# Patient Record
Sex: MALE | Race: WHITE | Employment: OTHER | URBAN - METROPOLITAN AREA
[De-identification: names, ages, dates, MRNs, and addresses within clinical notes are randomized per-mention and may not be internally consistent; named-entity substitution may affect disease eponyms.]

---

## 2017-01-02 DIAGNOSIS — I10 HYPERTENSION GOAL BP (BLOOD PRESSURE) < 140/90: Primary | ICD-10-CM

## 2017-01-02 DIAGNOSIS — K21.9 GASTROESOPHAGEAL REFLUX DISEASE WITHOUT ESOPHAGITIS: ICD-10-CM

## 2017-01-03 RX ORDER — ATENOLOL 25 MG/1
TABLET ORAL
Qty: 90 TABLET | Refills: 1 | Status: SHIPPED | OUTPATIENT
Start: 2017-01-03 | End: 2017-06-27

## 2017-01-03 NOTE — TELEPHONE ENCOUNTER
Omeprazole       Last Written Prescription Date: 10/28/2015  Last Fill Quantity: 180,  # refills: PRN   Last Office Visit with [facility], [facility] or [facility] prescribing provider: 06/30/2016    Atenolol       Last Written Prescription Date: 10/28/2015  Last Fill Quantity: 90, # refills: PRN  Last Office Visit with [facility], [facility] or [facility] prescribing provider: 10/28/2015       POTASSIUM   Date Value Ref Range Status   03/03/2015 4.1 3.4 - 5.3 mmol/L Final     CREATININE   Date Value Ref Range Status   03/03/2015 0.90 0.66 - 1.25 mg/dL Final     BP Readings from Last 3 Encounters:   06/30/16 117/76   05/13/16 120/77   05/08/16 124/76

## 2017-01-03 NOTE — TELEPHONE ENCOUNTER
Prescription approved per [facility].  Amarilys Mackenzie RN

## 2017-02-17 DIAGNOSIS — A60.00 HERPES SIMPLEX INFECTION OF GENITOURINARY SYSTEM: ICD-10-CM

## 2017-02-17 DIAGNOSIS — A60.00 HERPES GENITALIS: Primary | ICD-10-CM

## 2017-02-17 NOTE — TELEPHONE ENCOUNTER
Routing refill request to provider for review/approval because:  Labs not current:  Needs yearly Creatinine  A break in medication  Vanessa DEY RN

## 2017-02-17 NOTE — TELEPHONE ENCOUNTER
Pending Prescriptions:                       Disp   Refills    acyclovir (ZOVIRAX) 800 MG tablet [Pharma*30 tab*PRN          Sig: TAKE 1 TABLET BY MOUTH THREE TIMES DAILY AT START           OF SYMPTOMS         Last Written Prescription Date: 9/1/15  Last Fill Quantity: 30, # refills: prn  Last Office Visit with [facility], [facility] or [facility] prescribing provider: 6/30/16 with Dr. Suero       Creatinine   Date Value Ref Range Status   03/03/2015 0.90 0.66 - 1.25 mg/dL Final     Navya Joshi MA  [facility]

## 2017-02-18 PROBLEM — A60.00 HERPES SIMPLEX INFECTION OF GENITOURINARY SYSTEM: Status: ACTIVE | Noted: 2017-02-18

## 2017-02-18 PROBLEM — A60.00 HERPES GENITALIS: Status: ACTIVE | Noted: 2017-02-18

## 2017-02-18 RX ORDER — ACYCLOVIR 800 MG/1
TABLET ORAL
Qty: 30 TABLET | Status: SHIPPED | OUTPATIENT
Start: 2017-02-18 | End: 2018-03-13

## 2017-04-04 ENCOUNTER — MYC MEDICAL ADVICE (OUTPATIENT)
Dept: FAMILY MEDICINE | Facility: CLINIC | Age: 62
End: 2017-04-04

## 2017-05-01 ENCOUNTER — OFFICE VISIT (OUTPATIENT)
Dept: FAMILY MEDICINE | Facility: CLINIC | Age: 62
End: 2017-05-01
Payer: COMMERCIAL

## 2017-05-01 VITALS
WEIGHT: 178 LBS | DIASTOLIC BLOOD PRESSURE: 83 MMHG | TEMPERATURE: 97.1 F | OXYGEN SATURATION: 96 % | BODY MASS INDEX: 26.98 KG/M2 | HEART RATE: 74 BPM | SYSTOLIC BLOOD PRESSURE: 139 MMHG | HEIGHT: 68 IN | RESPIRATION RATE: 16 BRPM

## 2017-05-01 DIAGNOSIS — I10 HYPERTENSION GOAL BP (BLOOD PRESSURE) < 140/90: Primary | ICD-10-CM

## 2017-05-01 LAB
ANION GAP SERPL CALCULATED.3IONS-SCNC: 9 MMOL/L (ref 3–14)
BUN SERPL-MCNC: 13 MG/DL (ref 7–30)
CALCIUM SERPL-MCNC: 9.1 MG/DL (ref 8.5–10.1)
CHLORIDE SERPL-SCNC: 106 MMOL/L (ref 94–109)
CO2 SERPL-SCNC: 25 MMOL/L (ref 20–32)
CREAT SERPL-MCNC: 0.92 MG/DL (ref 0.66–1.25)
CREAT UR-MCNC: 109 MG/DL
GFR SERPL CREATININE-BSD FRML MDRD: 84 ML/MIN/1.7M2
GLUCOSE SERPL-MCNC: 92 MG/DL (ref 70–99)
MICROALBUMIN UR-MCNC: <5 MG/L
MICROALBUMIN/CREAT UR: NORMAL MG/G CR (ref 0–17)
POTASSIUM SERPL-SCNC: 3.9 MMOL/L (ref 3.4–5.3)
SODIUM SERPL-SCNC: 140 MMOL/L (ref 133–144)

## 2017-05-01 PROCEDURE — 36415 COLL VENOUS BLD VENIPUNCTURE: CPT | Performed by: FAMILY MEDICINE

## 2017-05-01 PROCEDURE — 82043 UR ALBUMIN QUANTITATIVE: CPT | Performed by: FAMILY MEDICINE

## 2017-05-01 PROCEDURE — 80048 BASIC METABOLIC PNL TOTAL CA: CPT | Performed by: FAMILY MEDICINE

## 2017-05-01 PROCEDURE — 99213 OFFICE O/P EST LOW 20 MIN: CPT | Performed by: FAMILY MEDICINE

## 2017-05-01 RX ORDER — LISINOPRIL 10 MG/1
10 TABLET ORAL DAILY
Qty: 30 TABLET | Refills: 0 | Status: SHIPPED | OUTPATIENT
Start: 2017-05-01 | End: 2017-07-07

## 2017-05-01 NOTE — NURSING NOTE
"Chief Complaint   Patient presents with     Hypertension     /83  Pulse 74  Temp 97.1  F (36.2  C) (Oral)  Resp 16  Ht 5' 8\" (1.727 m)  Wt 178 lb (80.7 kg)  SpO2 96%  BMI 27.06 kg/m2 Estimated body mass index is 27.06 kg/(m^2) as calculated from the following:    Height as of this encounter: 5' 8\" (1.727 m).    Weight as of this encounter: 178 lb (80.7 kg).  bp completed using cuff size: regular       Health Maintenance addressed:  NONE    n/a    Hiral Issa MA     "

## 2017-05-01 NOTE — PROGRESS NOTES
SUBJECTIVE:                                                    Edgardo Dominguez is a 61 year old male who presents to clinic today for the following health issues:      Hypertension Follow-up      Outpatient blood pressures are being checked at home and work.  Results are 150/90.    Low Salt Diet: not monitoring salt     Also discuss asiatica nerve on both sides very painful.      Amount of exercise or physical activity: 2-3 days/week for an average of 45-60 minutes    Problems taking medications regularly: No    Medication side effects: none    Diet: regular (no restrictions)          Problem list and histories reviewed & adjusted, as indicated.  Additional history: as documented      Reviewed and updated as needed this visit by clinical staff  Tobacco  Allergies  Meds  Problems  Med Hx  Surg Hx  Fam Hx  Soc Hx        Reviewed and updated as needed this visit by Provider  Meds  Problems         He has had hypertension well controlled with atenolol 25 mg for over 10 years, but in the last month he has noticed more headaches and he finally got out his blood pressure monitor and discovered he was getting around 150/90 most of the time  Especially later in the day, sometimes okay in the morning. He said that he had his blood pressure cuff checked against the nurses at work so he thinks it's accurate but I don't know how long ago he did that. He exercises 3 times a week, has a sedentary job, reactive on the weekends. He's had a little more sciatica lately in the last few weeks.    Objective: Multiple blood pressure readings are really quite good. He was a little lower on the left than the rightand I got numbers as low as 124/66 and as high as 134/78. Heart is regular without murmurs. Lungs are clear. Abdomen benign. No edema.    Assessment and plan: Likely his hypertension isn't as well controlled although its not that it so good this morning. He will check and make sure that his home blood pressure cuff is  accurate but if it is I think it would be wise for him to start lisinopril 10 mg a day and see me in 1 month. Will do basic lab tests today.

## 2017-05-01 NOTE — MR AVS SNAPSHOT
"              After Visit Summary   5/1/2017    Edgardo Dominguez    MRN: [id number]           Patient Information     Date Of Birth          1955        Visit Information        Provider Department      5/1/2017 8:15 AM Maurice Suero MD [facility]        Today's Diagnoses     Hypertension goal BP (blood pressure) < 140/90    -  1       Follow-ups after your visit        Who to contact     If you have questions or need follow up information about today's clinic visit or your schedule please contact [facility] directly at [phone].  Normal or non-critical lab and imaging results will be communicated to you by [employer], letter or phone within 4 business days after the clinic has received the results. If you do not hear from us within 7 days, please contact the clinic through [employer] or phone. If you have a critical or abnormal lab result, we will notify you by phone as soon as possible.  Submit refill requests through [employer]ee or call your pharmacy and they will forward the refill request to us. Please allow 3 business days for your refill to be completed.          Additional Information About Your Visit        MyChart Information     [employer] gives you secure access to your electronic health record. If you see a primary care provider, you can also send messages to your care team and make appointments. If you have questions, please call your primary care clinic.  If you do not have a primary care provider, please call [phone] and they will assist you.        Care EveryWhere ID     This is your Care EveryWhere ID. This could be used by other organizations to access your [place] medical records  [id number]        Your Vitals Were     Pulse Temperature Respirations Height Pulse Oximetry BMI (Body Mass Index)    74 97.1  F (36.2  C) (Oral) 16 5' 8\" (1.727 m) 96% 27.06 kg/m2       Blood Pressure from Last 3 Encounters:   05/01/17 139/83   06/30/16 117/76   05/13/16 120/77    " Weight from Last 3 Encounters:   05/01/17 178 lb (80.7 kg)   05/13/16 177 lb 3.2 oz (80.4 kg)   05/08/16 172 lb 3.2 oz (78.1 kg)              We Performed the Following     Albumin Random Urine Quantitative     Basic metabolic panel          Today's Medication Changes          These changes are accurate as of: 5/1/17 11:34 AM.  If you have any questions, ask your nurse or doctor.               Start taking these medicines.        Dose/Directions    lisinopril 10 MG tablet   Commonly known as:  PRINIVIL/ZESTRIL   Used for:  Hypertension goal BP (blood pressure) < 140/90   Started by:  Maurice Suero MD        Dose:  10 mg   Take 1 tablet (10 mg) by mouth daily   Quantity:  30 tablet   Refills:  0            Where to get your medicines      These medications were sent to [id number] - [place] - [address] AT [facility] [address]  [address], [place] [address]    Hours:  24-hours Phone:  [phone]     lisinopril 10 MG tablet                Primary Care Provider Office Phone # Fax #    Maurice Suero [phone]       [facility] [address] [facility]  [address]  [place] [address]        Thank you!     Thank you for choosing [facility]  for your care. Our goal is always to provide you with excellent care. Hearing back from our patients is one way we can continue to improve our services. Please take a few minutes to complete the written survey that you may receive in the mail after your visit with us. Thank you!             Your Updated Medication List - Protect others around you: Learn how to safely use, store and throw away your medicines at www.disposemymeds.org.          This list is accurate as of: 5/1/17 11:34 AM.  Always use your most recent med list.                   Brand Name Dispense Instructions for use    acyclovir 800 MG tablet    ZOVIRAX    30 tablet    TAKE 1 TABLET BY MOUTH THREE TIMES DAILY AT START OF SYMPTOMS       atenolol  25 MG tablet    TENORMIN    90 tablet    TAKE 1 TABLET BY MOUTH DAILY       lisinopril 10 MG tablet    PRINIVIL/ZESTRIL    30 tablet    Take 1 tablet (10 mg) by mouth daily       mometasone 0.1 % cream    ELOCON    45 g    Apply  topically daily.       omeprazole 20 MG CR capsule    priLOSEC    180 capsule    TAKE TWO CAPSULES BY MOUTH DAILY       TYLENOL PO

## 2017-05-02 ENCOUNTER — MYC MEDICAL ADVICE (OUTPATIENT)
Dept: FAMILY MEDICINE | Facility: CLINIC | Age: 62
End: 2017-05-02

## 2017-05-02 DIAGNOSIS — M54.40 ACUTE RIGHT-SIDED LOW BACK PAIN WITH SCIATICA, SCIATICA LATERALITY UNSPECIFIED: Primary | ICD-10-CM

## 2017-05-02 NOTE — TELEPHONE ENCOUNTER
Dr Suero please see message below. The clinic the patient is requesting is out of network for his insurance. With his insurance he is assigned to the [facility] network. Please advise.  Thanks    Sarahy Teague  Referral Coordinator

## 2017-05-02 NOTE — TELEPHONE ENCOUNTER
Yes it matters with [facility]. If we have those services available the patient needs to stay within [facility] if assigned to the [facility] network.    Sarahy Teague  Referral Coordinator

## 2017-05-08 ENCOUNTER — THERAPY VISIT (OUTPATIENT)
Dept: PHYSICAL THERAPY | Facility: CLINIC | Age: 62
End: 2017-05-08
Payer: COMMERCIAL

## 2017-05-08 DIAGNOSIS — M54.16 LUMBAR RADICULOPATHY: Primary | ICD-10-CM

## 2017-05-08 DIAGNOSIS — M54.50 LOW BACK PAIN: ICD-10-CM

## 2017-05-08 PROCEDURE — 97110 THERAPEUTIC EXERCISES: CPT | Mod: GP | Performed by: PHYSICAL THERAPIST

## 2017-05-08 PROCEDURE — 97161 PT EVAL LOW COMPLEX 20 MIN: CPT | Mod: GP | Performed by: PHYSICAL THERAPIST

## 2017-05-08 PROCEDURE — 97530 THERAPEUTIC ACTIVITIES: CPT | Mod: GP | Performed by: PHYSICAL THERAPIST

## 2017-05-08 NOTE — MR AVS SNAPSHOT
After Visit Summary   5/8/2017    Edgardo Dominguez    MRN: [id number]           Patient Information     Date Of Birth          1955        Visit Information        Provider Department      5/8/2017 9:20 AM Davina Be, PT [facility] [place] [facility]        Today's Diagnoses     Lumbar radiculopathy    -  1    Low back pain           Follow-ups after your visit        Your next 10 appointments already scheduled     May 15, 2017 10:00 AM CDT   [facility] Spine with Davina Be PT   [place] [facility] ([facility]  )    [address]  [facility] [address]   [phone]            May 22, 2017 10:10 AM CDT   [facility] Spine with Renée Cole PT   [place] [facility] ([facility]  )    [address]  [facility] [address]   [phone]            Jun 05, 2017  9:20 AM CDT   [facility] Spine with Davina Be PT   [facility] ([facility]  )    [address]  [facility] [address]   [phone]            Jun 12, 2017  9:20 AM CDT   [facility] Spine with Davina Be PT   [facility] ([facility]  )    [address]  [facility] [address]   [phone]            Jun 19, 2017  9:20 AM CDT   [facility] Spine with Davina Be PT   [place] [facility] ([facility]  )    [address]  [facility] [address]   [phone]              Who to contact     If you have questions or need follow up information about today's clinic visit or your schedule please contact [facility] directly at [phone].  Normal or non-critical lab and imaging results will be communicated to you by MyChart, letter or phone within 4 business days after the clinic has received the results. If you do not  hear from us within 7 days, please contact the clinic through [employer] or phone. If you have a critical or abnormal lab result, we will notify you by phone as soon as possible.  Submit refill requests through [employer] or call your pharmacy and they will forward the refill request to us. Please allow 3 business days for your refill to be completed.          Additional Information About Your Visit        [employer] Information     [employer] gives you secure access to your electronic health record. If you see a primary care provider, you can also send messages to your care team and make appointments. If you have questions, please call your primary care clinic.  If you do not have a primary care provider, please call [phone] and they will assist you.        Care EveryWhere ID     This is your Care EveryWhere ID. This could be used by other organizations to access your [place] medical records  [id number]         Blood Pressure from Last 3 Encounters:   05/01/17 139/83   06/30/16 117/76   05/13/16 120/77    Weight from Last 3 Encounters:   05/01/17 80.7 kg (178 lb)   05/13/16 80.4 kg (177 lb 3.2 oz)   05/08/16 78.1 kg (172 lb 3.2 oz)              We Performed the Following     HC PT EVAL, LOW COMPLEXITY     [facility]WN INITIAL EVAL REPORT     THERAPEUTIC ACTIVITIES     THERAPEUTIC EXERCISES        Primary Care Provider Office Phone # Fax #    Maurice Suero [phone]       [facility] [address] [address]        Thank you!     Thank you for choosing [facility]  for your care. Our goal is always to provide you with excellent care. Hearing back from our patients is one way we can continue to improve our services. Please take a few minutes to complete the written survey that you may receive in the mail after your visit with us. Thank you!             Your Updated Medication List - Protect others around you: Learn how to safely  use, store and throw away your medicines at www.disposemymeds.org.          This list is accurate as of: 5/8/17 12:42 PM.  Always use your most recent med list.                   Brand Name Dispense Instructions for use    acyclovir 800 MG tablet    ZOVIRAX    30 tablet    TAKE 1 TABLET BY MOUTH THREE TIMES DAILY AT START OF SYMPTOMS       atenolol 25 MG tablet    TENORMIN    90 tablet    TAKE 1 TABLET BY MOUTH DAILY       lisinopril 10 MG tablet    PRINIVIL/ZESTRIL    30 tablet    Take 1 tablet (10 mg) by mouth daily       mometasone 0.1 % cream    ELOCON    45 g    Apply  topically daily.       omeprazole 20 MG CR capsule    priLOSEC    180 capsule    TAKE TWO CAPSULES BY MOUTH DAILY       TYLENOL PO

## 2017-05-08 NOTE — PROGRESS NOTES
Subjective:    Patient is a 61 year old male presenting with rehab back hpi.   Edgardo Dominguez is a 61 year old male with a lumbar condition.  Condition occurred with:  Insidious onset.  Condition occurred: for unknown reasons.  This is a new condition  5/2/17 referral. Pt reports hx of back pain, however this is the first time it has gone down the legs.  Pain started about 2 weeks ago - pt states he recently bought a new vehicle, doesn't know if that contributes.  Also bought a small tree the day pain began, was moving it around the yard.  Previous episodes were treated with chiropractic, no help this episode.  Pain is primarily with sitting; pt reports work chair is adjustbale, not the best quality.  Pt reports R great toe fusion about 1 year ago, current L knee bursitis..    Patient reports pain:  Central lumbar spine.  Radiates to:  Thigh left, thigh right, gluteals right and gluteals left (to mid thigh, posterior).  Pain is described as aching and is constant and reported as 5/10.  Associated symptoms:  Tingling (intermittent). Pain is the same all the time.  Symptoms are exacerbated by sitting and relieved by ice, NSAID's and other (walking).  Since onset symptoms are unchanged.    Previous treatment includes chiropractic.  There was no improvement following previous treatment.  General health as reported by patient is good.  Pertinent medical history includes:  High blood pressure and implanted device (pin in R great toe; HTN working on managing with PCP).  Medical allergies: no.  Other surgeries include:  Orthopedic surgery (R great toe, ankle).  Current medications:  High blood pressure medication.  Current occupation is school counselor.  Patient is working in normal job without restrictions (has been trying to get up and move more).  Primary job tasks include:  Prolonged sitting (computer work).    Barriers include:  None as reported by the patient.    Red flags:  Pain at rest/night.                         Objective:    Standing Alignment:    Cervical/thoracic deviations alignment: mid-thoracic kyphosis increased.    Lumbar:  Lordosis decr                      Physical Exam     Functional Tests:  Forward bend: reverses lumbar curve, most motion comes from mid/low thoracic, lacks full hip flexion, no change in sxs  Back bend: no change in sxs, most motion from high lumbar  L trunk rotation: no change in sxs  R trunk rotation: no change in sxs  L trunk lateral flexion: no change in sxs  R trunk lateral flexion: no change in sxs  L single leg stance: Trendelenberg, no change in sxs  R single leg stance: no change in sxs  Prone hip ext with knee extended: B recruits hamstrings prior to glut max, goes into lumbar ext and rotation but no change in sxs  sidelying hip abd/LR: B goes into lumbar rotation    Sensation:  Light touch intact and symmetrical B LEs    Strength:  Hip flexion: B 5/5 pain free  Knee extension: B 5/5 pain free  Dorsiflexion: B 5/5 pain free  Glut med: R 3+/5, L 4/5 pain free  Glut max: B 5/5 pain free  Hamstrings: B 5/5 pain free    ROM:  Trunk AROM WNL    Relative Flexibility:  Lumbar spine is relatively more flexible than B hip joints    Palpation:  Slight tenderness L glut max, otherwise pain free at B paraspinals, R glute, B posterior thighs  Stiffness to cetnral PAs of lumbar spine, no increase in sxs    Edema:  None noted    Gait:  R trunk lean, R>L Trendelenberg        General     ROS    Assessment/Plan:      Patient is a 61 year old male with lumbar complaints.    Patient has the following significant findings with corresponding treatment plan.                Diagnosis 1:  LBP with radiculopathy  Pain -  hot/cold therapy, manual therapy, education and home program  Decreased joint mobility - manual therapy, therapeutic exercise, therapeutic activity and home program  Decreased strength - therapeutic exercise, therapeutic activities and home program  Impaired gait - gait training and home  program  Impaired muscle performance - neuro re-education and home program  Decreased function - therapeutic activities and home program  Impaired posture - neuro re-education, therapeutic activities and home program    Therapy Evaluation Codes:   1) History comprised of:   Personal factors that impact the plan of care:      Age and Profession.    Comorbidity factors that impact the plan of care are:      High blood pressure, Implanted device and Pain at night/rest.     Medications impacting care: High blood pressure.  2) Examination of Body Systems comprised of:   Body structures and functions that impact the plan of care:      Lumbar spine.   Activity limitations that impact the plan of care are:      Sitting.  3) Clinical presentation characteristics are:   Stable/Uncomplicated.  4) Decision-Making    Low complexity using standardized patient assessment instrument and/or measureable assessment of functional outcome.  Cumulative Therapy Evaluation is: Low complexity.    Previous and current functional limitations:  (See Goal Flow Sheet for this information)    Short term and Long term goals: (See Goal Flow Sheet for this information)     Communication ability:  Patient appears to be able to clearly communicate and understand verbal and written communication and follow directions correctly.  Treatment Explanation - The following has been discussed with the patient:   RX ordered/plan of care  Anticipated outcomes  Possible risks and side effects  This patient would benefit from PT intervention to resume normal activities.   Rehab potential is good.    Frequency:  1 X week, once daily  Duration:  for 6 weeks  Discharge Plan:  Achieve all LTG.  Independent in home treatment program.  Reach maximal therapeutic benefit.    Please refer to the daily flowsheet for treatment today, total treatment time and time spent performing 1:1 timed codes.

## 2017-05-15 ENCOUNTER — THERAPY VISIT (OUTPATIENT)
Dept: PHYSICAL THERAPY | Facility: CLINIC | Age: 62
End: 2017-05-15
Payer: COMMERCIAL

## 2017-05-15 DIAGNOSIS — M54.16 LUMBAR RADICULOPATHY: ICD-10-CM

## 2017-05-15 DIAGNOSIS — M54.50 LOW BACK PAIN: ICD-10-CM

## 2017-05-15 PROCEDURE — 97112 NEUROMUSCULAR REEDUCATION: CPT | Mod: GP | Performed by: PHYSICAL THERAPIST

## 2017-05-15 PROCEDURE — 97110 THERAPEUTIC EXERCISES: CPT | Mod: GP | Performed by: PHYSICAL THERAPIST

## 2017-05-22 ENCOUNTER — THERAPY VISIT (OUTPATIENT)
Dept: PHYSICAL THERAPY | Facility: CLINIC | Age: 62
End: 2017-05-22
Payer: COMMERCIAL

## 2017-05-22 DIAGNOSIS — M54.41 ACUTE BILATERAL LOW BACK PAIN WITH BILATERAL SCIATICA: ICD-10-CM

## 2017-05-22 DIAGNOSIS — M54.42 ACUTE BILATERAL LOW BACK PAIN WITH BILATERAL SCIATICA: ICD-10-CM

## 2017-05-22 DIAGNOSIS — M54.16 LUMBAR RADICULOPATHY: ICD-10-CM

## 2017-05-22 PROCEDURE — 97112 NEUROMUSCULAR REEDUCATION: CPT | Mod: GP | Performed by: PHYSICAL THERAPIST

## 2017-05-22 PROCEDURE — 97110 THERAPEUTIC EXERCISES: CPT | Mod: GP | Performed by: PHYSICAL THERAPIST

## 2017-05-22 NOTE — MR AVS SNAPSHOT
After Visit Summary   5/22/2017    Edgardo Dominguez    MRN: [id number]           Patient Information     Date Of Birth          1955        Visit Information        Provider Department      5/22/2017 10:10 AM Renée Cole, PT [facility] Athletic Medicine [facility] Physical Therapy        Today's Diagnoses     Acute bilateral low back pain with bilateral sciatica        Lumbar radiculopathy           Follow-ups after your visit        Your next 10 appointments already scheduled     Jun 05, 2017  9:20 AM CDT   SHAWN Spine with Davina Be PT   [facility] Athletic Medicine [facility] Physical Therapy ([facility]  )    [address]  [facility] [address]   [phone]            Jun 12, 2017  9:20 AM CDT   SHAWN Spine with Davina Be PT   [facility] Athletic [facility] Physical Therapy ([facility]  )    [address]  [facility] [address]   [phone]            Jun 19, 2017  9:20 AM CDT   SHAWN Spine with Davina Be PT   [facility] Athletic [facility] Physical Therapy ([facility]  )    [address]  [facility] [address]   [phone]              Who to contact     If you have questions or need follow up information about today's clinic visit or your schedule please contact [facility] ATHLETIC [facility] PHYSICAL THERAPY directly at [phone].  Normal or non-critical lab and imaging results will be communicated to you by MyChart, letter or phone within 4 business days after the clinic has received the results. If you do not hear from us within 7 days, please contact the clinic through MyChart or phone. If you have a critical or abnormal lab result, we will notify you by phone as soon as possible.  Submit refill requests through [employer] or call your pharmacy and they will forward the refill request to us. Please allow 3 business days for your refill to be completed.          Additional Information About Your  Visit        [employer] Information     [employer] gives you secure access to your electronic health record. If you see a primary care provider, you can also send messages to your care team and make appointments. If you have questions, please call your primary care clinic.  If you do not have a primary care provider, please call [phone] and they will assist you.        Care EveryWhere ID     This is your Care EveryWhere ID. This could be used by other organizations to access your [place] medical records  [id number]         Blood Pressure from Last 3 Encounters:   05/01/17 139/83   06/30/16 117/76   05/13/16 120/77    Weight from Last 3 Encounters:   05/01/17 80.7 kg (178 lb)   05/13/16 80.4 kg (177 lb 3.2 oz)   05/08/16 78.1 kg (172 lb 3.2 oz)              We Performed the Following     NEUROMUSCULAR RE-EDUCATION     THERAPEUTIC EXERCISES        Primary Care Provider Office Phone # Fax #    Maurice Suero [phone]       [facility] [address] [address]        Thank you!     Thank you for choosing [facility] PHYSICAL THERAPY  for your care. Our goal is always to provide you with excellent care. Hearing back from our patients is one way we can continue to improve our services. Please take a few minutes to complete the written survey that you may receive in the mail after your visit with us. Thank you!             Your Updated Medication List - Protect others around you: Learn how to safely use, store and throw away your medicines at www.disposemymeds.org.          This list is accurate as of: 5/22/17 10:56 AM.  Always use your most recent med list.                   Brand Name Dispense Instructions for use    acyclovir 800 MG tablet    ZOVIRAX    30 tablet    TAKE 1 TABLET BY MOUTH THREE TIMES DAILY AT START OF SYMPTOMS       atenolol 25 MG tablet    TENORMIN    90 tablet    TAKE 1 TABLET BY MOUTH DAILY       lisinopril 10 MG  tablet    PRINIVIL/ZESTRIL    30 tablet    Take 1 tablet (10 mg) by mouth daily       mometasone 0.1 % cream    ELOCON    45 g    Apply  topically daily.       omeprazole 20 MG CR capsule    priLOSEC    180 capsule    TAKE TWO CAPSULES BY MOUTH DAILY       TYLENOL PO

## 2017-06-26 ENCOUNTER — OFFICE VISIT (OUTPATIENT)
Dept: ORTHOPEDICS | Facility: CLINIC | Age: 62
End: 2017-06-26
Payer: COMMERCIAL

## 2017-06-26 ENCOUNTER — RADIANT APPOINTMENT (OUTPATIENT)
Dept: GENERAL RADIOLOGY | Facility: CLINIC | Age: 62
End: 2017-06-26
Attending: PHYSICAL MEDICINE & REHABILITATION
Payer: COMMERCIAL

## 2017-06-26 VITALS
HEIGHT: 68 IN | WEIGHT: 172 LBS | DIASTOLIC BLOOD PRESSURE: 82 MMHG | SYSTOLIC BLOOD PRESSURE: 131 MMHG | BODY MASS INDEX: 26.07 KG/M2

## 2017-06-26 DIAGNOSIS — M54.41 ACUTE BILATERAL LOW BACK PAIN WITH BILATERAL SCIATICA: Primary | ICD-10-CM

## 2017-06-26 DIAGNOSIS — M51.369 LUMBAR DEGENERATIVE DISC DISEASE: ICD-10-CM

## 2017-06-26 DIAGNOSIS — M54.42 ACUTE BILATERAL LOW BACK PAIN WITH BILATERAL SCIATICA: ICD-10-CM

## 2017-06-26 DIAGNOSIS — M54.42 ACUTE BILATERAL LOW BACK PAIN WITH BILATERAL SCIATICA: Primary | ICD-10-CM

## 2017-06-26 DIAGNOSIS — M54.41 ACUTE BILATERAL LOW BACK PAIN WITH BILATERAL SCIATICA: ICD-10-CM

## 2017-06-26 PROCEDURE — 99204 OFFICE O/P NEW MOD 45 MIN: CPT | Performed by: PHYSICAL MEDICINE & REHABILITATION

## 2017-06-26 PROCEDURE — 72100 X-RAY EXAM L-S SPINE 2/3 VWS: CPT

## 2017-06-26 RX ORDER — GABAPENTIN 300 MG/1
300 CAPSULE ORAL 3 TIMES DAILY
Qty: 90 CAPSULE | Refills: 2 | Status: SHIPPED | OUTPATIENT
Start: 2017-06-26 | End: 2018-05-24

## 2017-06-26 NOTE — MR AVS SNAPSHOT
After Visit Summary   6/26/2017    Edgardo Dominguez    MRN: [id number]           Patient Information     Date Of Birth          1955        Visit Information        Provider Department      6/26/2017 11:20 AM Aung Randle DO [facility]        Today's Diagnoses     Acute bilateral low back pain with bilateral sciatica    -  1    Lumbar degenerative disc disease          Care Instructions    We addressed the following today:    1. Low back pain with radiation    Activity modification as discussed  Home exercise program as instructed  Topical Treatments: Ice or heat  Over the counter medication: Acetaminophen (Tylenol) 1000 mg every 6 hours with food (Maximum of 3000 mg/day)  Prescription Medication as directed: Initiate Gabapentin 300 mg at night for 3 days.  If able to tolerate medication without side effects, increase to 300 mg twice daily for 3 days.  If able to tolerate medication without side effects, increase to 300 mg three times daily for treatment purposes  Call/MyChart in 4 weeks if no improvement of symptoms to determine further medical care (sooner if needed; call direct clinic number [[phone]] at any time with questions or concerns)              Follow-ups after your visit        Your next 10 appointments already scheduled     Jul 07, 2017 10:00 AM CDT   Office Visit with Josef Ray MD   [facility] ([facility])    [address] [place] [address] [phone]           Bring a current list of meds and any records pertaining to this visit.  For Physicals, please bring immunization records and any forms needing to be filled out.  Please arrive 10 minutes early to complete paperwork.              Who to contact     If you have questions or need follow up information about today's clinic visit or your schedule please contact [facility] directly at [phone].  Normal or  "non-critical lab and imaging results will be communicated to you by MyChart, letter or phone within 4 business days after the clinic has received the results. If you do not hear from us within 7 days, please contact the clinic through [employer] or phone. If you have a critical or abnormal lab result, we will notify you by phone as soon as possible.  Submit refill requests through [employer] or call your pharmacy and they will forward the refill request to us. Please allow 3 business days for your refill to be completed.          Additional Information About Your Visit        [employer] Information     [employer] gives you secure access to your electronic health record. If you see a primary care provider, you can also send messages to your care team and make appointments. If you have questions, please call your primary care clinic.  If you do not have a primary care provider, please call [phone] and they will assist you.        Care EveryWhere ID     This is your Care EveryWhere ID. This could be used by other organizations to access your [place] medical records  [id number]        Your Vitals Were     Height BMI (Body Mass Index)                5' 8\" (1.727 m) 26.15 kg/m2           Blood Pressure from Last 3 Encounters:   06/26/17 131/82   05/01/17 139/83   06/30/16 117/76    Weight from Last 3 Encounters:   06/26/17 172 lb (78 kg)   05/01/17 178 lb (80.7 kg)   05/13/16 177 lb 3.2 oz (80.4 kg)                 Today's Medication Changes          These changes are accurate as of: 6/26/17 11:59 PM.  If you have any questions, ask your nurse or doctor.               Start taking these medicines.        Dose/Directions    gabapentin 300 MG capsule   Commonly known as:  NEURONTIN   Used for:  Acute bilateral low back pain with bilateral sciatica   Started by:  Aung Randle,         Dose:  300 mg   Take 1 capsule (300 mg) by mouth 3 times daily   Quantity:  90 capsule   Refills:  2            Where to get your medicines "      These medications were sent to [id number] - [place], [place] - [address] [facility] [address]  [address], [place] [address]    Hours:  24-hours Phone:  [phone]     gabapentin 300 MG capsule                Primary Care Provider Office Phone # Fax #    Maurice Suero [phone]       [facility] [address]  [facility] [address]        Equal Access to Services     TRISTAN SANTIAGO AH: Hadii aad ku hadasho Soomaali, waaxda luqadaha, qaybta kaalmada adeegyada, waxay idiin hayaan adeeg kharash la'trinidad . So [facility] [phone].    ATENCIÓN: Si habla español, tiene a raygoza disposición servicios gratuitos de asistencia lingüística. [facility] [phone].    We comply with applicable federal civil rights laws and [place] laws. We do not discriminate on the basis of race, color, national origin, age, disability sex, sexual orientation or gender identity.            Thank you!     Thank you for choosing [facility]  for your care. Our goal is always to provide you with excellent care. Hearing back from our patients is one way we can continue to improve our services. Please take a few minutes to complete the written survey that you may receive in the mail after your visit with us. Thank you!             Your Updated Medication List - Protect others around you: Learn how to safely use, store and throw away your medicines at www.disposemymeds.org.          This list is accurate as of: 6/26/17 11:59 PM.  Always use your most recent med list.                   Brand Name Dispense Instructions for use Diagnosis    acyclovir 800 MG tablet    ZOVIRAX    30 tablet    TAKE 1 TABLET BY MOUTH THREE TIMES DAILY AT START OF SYMPTOMS    Herpes simplex infection of genitourinary system       gabapentin 300 MG capsule    NEURONTIN    90 capsule    Take 1 capsule (300 mg) by mouth 3 times daily    Acute bilateral low back pain with bilateral sciatica        lisinopril 10 MG tablet    PRINIVIL/ZESTRIL    30 tablet    Take 1 tablet (10 mg) by mouth daily    Hypertension goal BP (blood pressure) < 140/90       mometasone 0.1 % cream    ELOCON    45 g    Apply  topically daily.    Eczema       TYLENOL PO

## 2017-06-26 NOTE — NURSING NOTE
"Chief Complaint   Patient presents with     Musculoskeletal Problem     acute low back pain with radiation       Initial /82  Ht 5' 8\" (1.727 m)  Wt 172 lb (78 kg)  BMI 26.15 kg/m2 Estimated body mass index is 26.15 kg/(m^2) as calculated from the following:    Height as of this encounter: 5' 8\" (1.727 m).    Weight as of this encounter: 172 lb (78 kg).  Medication Reconciliation: complete     James Cedeño ATC      "

## 2017-06-26 NOTE — PROGRESS NOTES
" [facility]   Clinic Visit s Jun 26, 2017    Subjective:  Edgardo Dominguez is a 61 year old male who is seen as self referral for evaluation of acute low back pain with radiation.    Symptoms began 2 months ago.  Reports insidious onset without acute precipitating event.  Reports constant dull back pain that is located bilateral low back with radiation present to bilateral posterior thigh regions.  Pain is 5/10 in maximal severity and 3/10 currently.  Symptoms are generally worse with sitting and with laying flat on his back and better with standing activities.  Other treatment has consisted of physical therapy (SHAWN), chiropractic care, massage, and Aceteminophen with moderate relief.  Associated symptoms include denies any bowel/bladder dysfunction or saddle anesthesia.  Denies any numbness/tingling/weakness of the lower extremities.  Denies any previous low back injuries/surgeries.    Patient's past medical, surgical, social, and family histories are reviewed today.  No pertinent past medical history  Past Medical History:   Diagnosis Date     Esophageal reflux      Hypertension      Other genital herpes      Unspecified disorder of lipoid metabolism      Varicella without mention of complication        Review of Systems:  Constitutional: NEGATIVE for fever, chills, or change in weight  Skin: NEGATIVE for worrisome rashes, moles, or lesions  Neuro: NEGATIVE for weakness of the lower extremities  MSK: see HPI  Additional 10 point ROS is negative other than symptoms noted above and in HPI    Objective:  /82  Ht 5' 8\" (1.727 m)  Wt 172 lb (78 kg)  BMI 26.15 kg/m2  General: healthy, alert, and in no distress  Skin: no suspicious lesions or rashes  Psych: mentation appears normal and affect normal/bright  HEENT: no scleral icterus  CV: no pedal edema  Resp: normal respiratory effort without conversational dyspnea   Neuro: decreased sensation to light touch of the left medial malleolus " region.  Motor strength as noted below  Lymph: no palpable lymphadenopathy    MSK:    THORACIC/LUMBAR SPINE  Inspection:    No redness, swelling, overlying skin change, or gross deformity/asymmetry  Palpation:    No tenderness over the lumbar spinous processes, left para lumbar muscles, right para lumbar muscles, left SI joint, right SI joint, left sciatic notch, or right sciatic notch  Range of Motion:     Lumbar flexion within normal limits    Lumbar extension within normal limits  Strength:    5/5 - quadriceps, hamstrings, tibialis anterior, gastrocsoleus, and extensor hallicus longus  Special Tests:    Positive: None    Negative: Straight leg raise (bilateral), SI joint compression (bilateral), and RC (bilateral)    BILATERAL HIPS  Inspection:    No swelling, bruising, discoloration, or obvious deformity or asymmetry  Passive Range of Motion:    IR within normal limits / ER within normal limits  Strength:    Flexion 5/5 / extension 5/5 / abduction 5/5  Special Tests:    Positive: None    Negative: Logroll, resisted gluteus medius provocation, anterior impingement (FADIR), and passive ER with hip flexion (Drehman's)    Imaging:  Lumbar spine x-rays were ordered, independent visualization of images was performed, and interpreted in the office today  Impression:  1. Slight levoscoliosis.  2. Mild degenerative disc disease at L2-3 and L3-4 with retrolisthesis.  3. Mild retrolisthesis at L1-2.  4. Vascular calcifications noted.  5. Mild multilevel lateral hyperostosis noted of the thoracic/lumbar spine regions.  6. Negative for fracture or other acute osseous abnormalities.    ASSESSMENT:  1. Acute bilateral low back pain with bilateral sciatica  2. Lumbar degenerative disc disease    PLAN:  1. Activity modification as discussed, including limitation of activities that cause pain/discomfort.  2. Acetaminophen/heat/ice as needed for improved pain control.  3. Initiate Gabapentin 300 mg at night for 3 days.  If able  to tolerate medication without side effects, increase to 300 mg twice daily for 3 days.  If able to tolerate medication without side effects, increase to 300 mg three times daily for treatment purposes.  4. Continue with home exercise program as previously prescribed during formal physical therapy.  5. Call/MyChart in 4 weeks if no improvement of symptoms to determine further medical care.  If symptoms resolve completely, can follow-up as needed.  Consider MRI of the lumbar spine without contrast, etc. as deemed appropriate moving forward. Instructed to contact our office should the condition evolve or worsen, or should any new/progressive neurologic symptoms develop.    Patient's conditions were thoroughly discussed during today's visit with greater than 50% of the visit spent counseling the patient with total time spent face-to-face with the patient being 15 minutes.    Aung Randle DO, [facility]    Disclaimer: This note consists of symbols derived from keyboarding, dictation and/or voice recognition software. As a result, there may be errors in the script that have gone undetected. Please consider this when interpreting information found in this chart.

## 2017-06-26 NOTE — Clinical Note
Dear Edgardo Orr saw me at [facility] on Jun 26, 2017.  Please refer to the visit note at your convenience and feel free to contact me should you have any questions.  Sincerely,  Aung Randle DO, [facility]

## 2017-06-26 NOTE — PATIENT INSTRUCTIONS
We addressed the following today:    1. Low back pain with radiation    Activity modification as discussed  Home exercise program as instructed  Topical Treatments: Ice or heat  Over the counter medication: Acetaminophen (Tylenol) 1000 mg every 6 hours with food (Maximum of 3000 mg/day)  Prescription Medication as directed: Initiate Gabapentin 300 mg at night for 3 days.  If able to tolerate medication without side effects, increase to 300 mg twice daily for 3 days.  If able to tolerate medication without side effects, increase to 300 mg three times daily for treatment purposes  Call/MyChart in 4 weeks if no improvement of symptoms to determine further medical care (sooner if needed; call direct clinic number [[phone]] at any time with questions or concerns)

## 2017-06-27 DIAGNOSIS — K21.9 GASTROESOPHAGEAL REFLUX DISEASE WITHOUT ESOPHAGITIS: ICD-10-CM

## 2017-06-27 DIAGNOSIS — I10 HYPERTENSION GOAL BP (BLOOD PRESSURE) < 140/90: ICD-10-CM

## 2017-06-27 RX ORDER — ATENOLOL 25 MG/1
TABLET ORAL
Qty: 30 TABLET | Refills: 0 | Status: SHIPPED | OUTPATIENT
Start: 2017-06-27 | End: 2017-07-07

## 2017-06-27 NOTE — TELEPHONE ENCOUNTER
ALBARO,  Refill request for former [facility] patient.  Patient scheduled to establish with you 7/7/2017  Will run out of medication before then.  Pended 30 day supply of below meds if you approve.  Thanks,  Vanessa DEY RN    Pending Prescriptions:                       Disp   Refills    atenolol (TENORMIN) 25 MG tablet [Pharmacy*90 tab*0        Sig: TAKE 1 TABLET BY MOUTH DAILY    omeprazole (PRILOSEC) 20 MG CR capsule [Ph*180 ca*0        Sig: TAKE TWO CAPSULES BY MOUTH DAILY    Atenolol      Last Written Prescription Date: 1/3/2017  Last Fill Quantity: 90, # refills: 1  Last Office Visit with [facility] or [facility] prescribing provider: 5/1/2017    Potassium   Date Value Ref Range Status   05/01/2017 3.9 3.4 - 5.3 mmol/L Final     Creatinine   Date Value Ref Range Status   05/01/2017 0.92 0.66 - 1.25 mg/dL Final     BP Readings from Last 3 Encounters:   06/26/17 131/82   05/01/17 139/83   06/30/16 117/76     Omeprazole      Last Written Prescription Date: 1/3/2017  Last Fill Quantity: 180,  # refills: 1   Last Office Visit with [facility], [facility] or [facility] prescribing provider: 5/1/2017

## 2017-07-07 ENCOUNTER — OFFICE VISIT (OUTPATIENT)
Dept: FAMILY MEDICINE | Facility: CLINIC | Age: 62
End: 2017-07-07
Payer: COMMERCIAL

## 2017-07-07 VITALS
HEART RATE: 79 BPM | DIASTOLIC BLOOD PRESSURE: 70 MMHG | BODY MASS INDEX: 27.55 KG/M2 | OXYGEN SATURATION: 98 % | SYSTOLIC BLOOD PRESSURE: 110 MMHG | RESPIRATION RATE: 16 BRPM | WEIGHT: 175.5 LBS | TEMPERATURE: 98.3 F | HEIGHT: 67 IN

## 2017-07-07 DIAGNOSIS — K21.9 GASTROESOPHAGEAL REFLUX DISEASE WITHOUT ESOPHAGITIS: ICD-10-CM

## 2017-07-07 DIAGNOSIS — Z00.00 ROUTINE HISTORY AND PHYSICAL EXAMINATION OF ADULT: Primary | ICD-10-CM

## 2017-07-07 DIAGNOSIS — M70.42 PREPATELLAR BURSITIS OF LEFT KNEE: ICD-10-CM

## 2017-07-07 DIAGNOSIS — I10 HYPERTENSION GOAL BP (BLOOD PRESSURE) < 140/90: ICD-10-CM

## 2017-07-07 PROCEDURE — 99396 PREV VISIT EST AGE 40-64: CPT | Performed by: FAMILY MEDICINE

## 2017-07-07 RX ORDER — LISINOPRIL 10 MG/1
10 TABLET ORAL DAILY
Qty: 30 TABLET | Refills: 0 | Status: CANCELLED | OUTPATIENT
Start: 2017-07-07

## 2017-07-07 RX ORDER — ATENOLOL 25 MG/1
25 TABLET ORAL DAILY
Qty: 90 TABLET | Refills: 3 | Status: SHIPPED | OUTPATIENT
Start: 2017-07-07 | End: 2017-07-13

## 2017-07-07 NOTE — NURSING NOTE
"Chief Complaint   Patient presents with     Physical     initial /70 (BP Location: Right arm, Cuff Size: Adult Regular)  Pulse 79  Temp 98.3  F (36.8  C) (Oral)  Resp 16  Ht 5' 6.75\" (1.695 m)  Wt 175 lb 8 oz (79.6 kg)  SpO2 98%  BMI 27.69 kg/m2 Estimated body mass index is 27.69 kg/(m^2) as calculated from the following:    Height as of this encounter: 5' 6.75\" (1.695 m).    Weight as of this encounter: 175 lb 8 oz (79.6 kg).  BP completed using cuff size: regular.  R arm      Health Maintenance that is potentially due pending provider review:  NONE    n/a    Edgar Soto ma  "

## 2017-07-07 NOTE — MR AVS SNAPSHOT
After Visit Summary   7/7/2017    Edgardo Dominguez    MRN: [id number]           Patient Information     Date Of Birth          1955        Visit Information        Provider Department      7/7/2017 10:00 AM Josef Ray MD [facility]        Today's Diagnoses     Routine history and physical examination of adult    -  1    Hypertension goal BP (blood pressure) < 140/90        Gastroesophageal reflux disease without esophagitis        Prepatellar bursitis of left knee          Care Instructions      Preventive Health Recommendations  Male Ages 50 - 64    Yearly exam:             See your health care provider every year in order to  o   Review health changes.   o   Discuss preventive care.    o   Review your medicines if your doctor has prescribed any.     Have a cholesterol test every 5 years, or more frequently if you are at risk for high cholesterol/heart disease.     Have a diabetes test (fasting glucose) every three years. If you are at risk for diabetes, you should have this test more often.     Have a colonoscopy at age 50, or have a yearly FIT test (stool test). These exams will check for colon cancer.      Talk with your health care provider about whether or not a prostate cancer screening test (PSA) is right for you.    You should be tested each year for STDs (sexually transmitted diseases), if you re at risk.     Shots: Get a flu shot each year. Get a tetanus shot every 10 years.     Nutrition:    Eat at least 5 servings of fruits and vegetables daily.     Eat whole-grain bread, whole-wheat pasta and brown rice instead of white grains and rice.     Talk to your provider about Calcium and Vitamin D.     Lifestyle    Exercise for at least 150 minutes a week (30 minutes a day, 5 days a week). This will help you control your weight and prevent disease.     Limit alcohol to one drink per day.     No smoking.     Wear sunscreen to prevent skin cancer.     See your dentist  every six months for an exam and cleaning.     See your eye doctor every 1 to 2 years.            Follow-ups after your visit        Additional Services     ORTHOPEDICS ADULT REFERRAL       Your provider has referred you to: [facility] [phone]   [email or web address]    Please be aware that coverage of these services is subject to the terms and limitations of your health insurance plan.  Call member services at your health plan with any benefit or coverage questions.      Please bring the following to your appointment:    >>   Any x-rays, CTs or MRIs which have been performed.  Contact the facility where they were done to arrange for  prior to your scheduled appointment.    >>   List of current medications   >>   This referral request   >>   Any documents/labs given to you for this referral                  Who to contact     If you have questions or need follow up information about today's clinic visit or your schedule please contact [facility] directly at [phone].  Normal or non-critical lab and imaging results will be communicated to you by [employer], letter or phone within 4 business days after the clinic has received the results. If you do not hear from us within 7 days, please contact the clinic through [employer] or phone. If you have a critical or abnormal lab result, we will notify you by phone as soon as possible.  Submit refill requests through [employer] or call your pharmacy and they will forward the refill request to us. Please allow 3 business days for your refill to be completed.          Additional Information About Your Visit        [employer] Information     [employer] gives you secure access to your electronic health record. If you see a primary care provider, you can also send messages to your care team and make appointments. If you have questions, please call your primary care clinic.  If you do not have a primary care provider, please call  "[phone] and they will assist you.        Care EveryWhere ID     This is your Care EveryWhere ID. This could be used by other organizations to access your [place] medical records  [id number]        Your Vitals Were     Pulse Temperature Respirations Height Pulse Oximetry BMI (Body Mass Index)    79 98.3  F (36.8  C) (Oral) 16 5' 6.75\" (1.695 m) 98% 27.69 kg/m2       Blood Pressure from Last 3 Encounters:   07/07/17 110/70   06/26/17 131/82   05/01/17 139/83    Weight from Last 3 Encounters:   07/07/17 175 lb 8 oz (79.6 kg)   06/26/17 172 lb (78 kg)   05/01/17 178 lb (80.7 kg)              We Performed the Following     ORTHOPEDICS ADULT REFERRAL          Today's Medication Changes          These changes are accurate as of: 7/7/17 10:21 AM.  If you have any questions, ask your nurse or doctor.               These medicines have changed or have updated prescriptions.        Dose/Directions    atenolol 25 MG tablet   Commonly known as:  TENORMIN   This may have changed:  See the new instructions.   Used for:  Hypertension goal BP (blood pressure) < 140/90   Changed by:  Josef Ray MD        Dose:  25 mg   Take 1 tablet (25 mg) by mouth daily   Quantity:  90 tablet   Refills:  3       omeprazole 20 MG CR capsule   Commonly known as:  priLOSEC   This may have changed:  See the new instructions.   Used for:  Gastroesophageal reflux disease without esophagitis   Changed by:  Josef Ray MD        Dose:  20 mg   Take 1 capsule (20 mg) by mouth 2 times daily   Quantity:  180 capsule   Refills:  3         Stop taking these medicines if you haven't already. Please contact your care team if you have questions.     lisinopril 10 MG tablet   Commonly known as:  PRINIVIL/ZESTRIL   Stopped by:  Josef Ray MD                Where to get your medicines      These medications were sent to [facility] [id number] [place] [address], [place] - [address] [facility] [address], " [facility] [address]    Hours:  24-hours Phone:  [phone]     atenolol 25 MG tablet    omeprazole 20 MG CR capsule                Primary Care Provider Office Phone # Fax #    Maurice Suero [phone]       [facility] [address]  [facility] [address]        Equal Access to Services     [facility]: Hadii aad ku hadasho Soomaali, waaxda luqadaha, qaybta kaalmada adeegyada, waxay idiin hayaan adeeg kharash laAntonietaaan ah. So [facility] [phone].    ATENCIÓN: Si habla español, tiene a raygoza disposición servicios gratuitos de asistencia lingüística. Bryant al [phone].    We comply with applicable federal civil rights laws and [place] laws. We do not discriminate on the basis of race, color, national origin, age, disability sex, sexual orientation or gender identity.            Thank you!     Thank you for choosing [facility]  for your care. Our goal is always to provide you with excellent care. Hearing back from our patients is one way we can continue to improve our services. Please take a few minutes to complete the written survey that you may receive in the mail after your visit with us. Thank you!             Your Updated Medication List - Protect others around you: Learn how to safely use, store and throw away your medicines at www.disposemymeds.org.          This list is accurate as of: 7/7/17 10:21 AM.  Always use your most recent med list.                   Brand Name Dispense Instructions for use Diagnosis    acyclovir 800 MG tablet    ZOVIRAX    30 tablet    TAKE 1 TABLET BY MOUTH THREE TIMES DAILY AT START OF SYMPTOMS    Herpes simplex infection of genitourinary system       atenolol 25 MG tablet    TENORMIN    90 tablet    Take 1 tablet (25 mg) by mouth daily    Hypertension goal BP (blood pressure) < 140/90       gabapentin 300 MG capsule    NEURONTIN    90 capsule    Take 1 capsule (300 mg) by mouth 3 times daily    Acute bilateral low back pain  with bilateral sciatica       mometasone 0.1 % cream    ELOCON    45 g    Apply  topically daily.    Eczema       omeprazole 20 MG CR capsule    priLOSEC    180 capsule    Take 1 capsule (20 mg) by mouth 2 times daily    Gastroesophageal reflux disease without esophagitis       TYLENOL PO

## 2017-07-07 NOTE — PROGRESS NOTES
SUBJECTIVE:   CC: Edgardo Dominguez is an 61 year old male who presents for preventative health visit.     Healthy Habits:    Do you get at least three servings of calcium containing foods daily (dairy, green leafy vegetables, etc.)? yes    Amount of exercise or daily activities, outside of work: 3-4 day(s) per week    Problems taking medications regularly No    Medication side effects: No    Have you had an eye exam in the past two years? yes    Do you see a dentist twice per year? yes    Do you have sleep apnea, excessive snoring or daytime drowsiness?no        PROBLEMS TO ADD ON...    Today's PHQ-2 Score:   PHQ-2 ( 1999 Pfizer) 5/1/2017 6/26/2016   Q1: Little interest or pleasure in doing things 0 -   Q2: Feeling down, depressed or hopeless 0 -   PHQ-2 Score 0 -   Q1: Little interest or pleasure in doing things - Not at all   Q2: Feeling down, depressed or hopeless - Not at all   PHQ-2 Score - 0       Abuse: Current or Past(Physical, Sexual or Emotional)- No  Do you feel safe in your environment - Yes    Social History   Substance Use Topics     Smoking status: Never Smoker     Smokeless tobacco: Never Used     Alcohol use 0.0 oz/week     0 Standard drinks or equivalent per week      Comment: 3 a week      The patient does not drink >3 drinks per day nor >7 drinks per week.    Last PSA: No results found for: PSA    Reviewed orders with patient. Reviewed health maintenance and updated orders accordingly - Yes    STD screening:  History   Sexual Activity     Sexual activity: Yes     Partners: Male     no screening desired today    Stable hypertension  Patient was actually started on lisinopril earlier this year because his blood pressures were high at home but he found out that the cuff was broken  So he stopped the lisinopril and today his blood pressure is quite good  Also needs refill on his reflux medication  He also has chronic left prepatellar bursitis and would like a new referral to orthopedics it never  went away and he would like to have it removed  Also this year he was diagnosed with spinal stenosis and is on some gabapentin for that but we are not managing today    ROS: As per HPI.  Constitutional: no recent illness, no fevers/sweats/chills, sleep normal  Eyes: No vision changes or eye irritation  Ears/Nose/Throat: No runny nose, sore throat or ear pain  CV: no palpitations, no chest pain, no lower extremity swelling.  Resp: no shortness of breath, wheezing, or cough.  GI: no nausea/vomiting/diarrhea, normal stooling pattern, no reflux symptoms, no black or bloody stools  : no burning or urgency with urination, no blood in urine, no sores or discharge.  Skin: no changing moles or other lesions, no rash  Musculoskeletal:  As noted above  Psych: no depression, no concerns about anxiety  Neuro: no new headaches, dizziness    I have reviewed and updated the patient's past medical, social and family history in the EMR. Current problems are:  Patient Active Problem List    Diagnosis Date Noted     Low back pain 05/08/2017     Priority: Medium     Lumbar radiculopathy 05/08/2017     Priority: Medium     Herpes simplex infection of genitourinary system 02/18/2017     Priority: Medium     's knee, left 10/28/2015     Priority: Medium     Hallux limitus of right foot 03/03/2015     Priority: Medium     Advanced directives, counseling/discussion 07/18/2012     Priority: Medium     Discussed advance care planning with patient; information given to patient to review. 7/18/2012        Hyperlipidemia LDL goal <160 07/19/2011     Priority: Medium     Hypertension goal BP (blood pressure) < 140/90 07/19/2011     Priority: Medium     Other genital herpes 09/15/2005     Priority: Medium     Esophageal reflux 11/11/2003     Priority: Medium     Family Hx:  Family History   Problem Relation Age of Onset     Hypertension Mother      Arthritis Mother      C.A.D. Mother      Lipids Mother      Breast Cancer Mother       mastectomy 2012     C.A.D. Father      GASTROINTESTINAL DISEASE Father      Alzheimer Disease Father      Lipids Father      HEART DISEASE Maternal Grandmother      Hypertension Maternal Grandmother      CEREBROVASCULAR DISEASE Maternal Grandmother      HEART DISEASE Maternal Grandfather      CEREBROVASCULAR DISEASE Maternal Grandfather      CEREBROVASCULAR DISEASE Paternal Grandmother      Alzheimer Disease Paternal Grandmother      HEART DISEASE Paternal Grandfather      CEREBROVASCULAR DISEASE Paternal Grandfather      Alcohol/Drug Sister      Depression Sister      Musculoskeletal Disorder Brother      Lipids Brother      Social History:  Social History   Substance Use Topics     Smoking status: Never Smoker     Smokeless tobacco: Never Used     Alcohol use 0.0 oz/week     0 Standard drinks or equivalent per week      Comment: 3 a week      Social History     Social History Narrative    Social Documentation:7/10        Balanced Diet: YES    Calcium intake: 1200mg per day (supplements),vit    Caffeine: 2-3 per day - soda    Exercise:  type of activity gym;  4-5  times per week    Sunscreen: Yes    Seatbelts:  Yes    Self Testicular Exam: yes    Physical/Emotional/Sexual Abuse: No     Do you feel safe in your environment? Yes        Cholesterol screen up to date: today    Eye Exam up to date: Yes    Dental Exam up to date: Yes    Dexa Scan up to date: Does Not Apply    Colonoscopy up to date: Yes 11/23/2005    Immunizations up to date: Yes    Glucose screen if over 40:  No -         Edgar Soto ma                 Allergies:     Allergies   Allergen Reactions     Hydrocodone Nausea     Oxycodone Nausea      Current Medications:  Current Outpatient Prescriptions   Medication Sig Dispense Refill     omeprazole (PRILOSEC) 20 MG CR capsule Take 1 capsule (20 mg) by mouth 2 times daily 180 capsule 3     atenolol (TENORMIN) 25 MG tablet Take 1 tablet (25 mg) by mouth daily 90 tablet 3     gabapentin (NEURONTIN) 300  "MG capsule Take 1 capsule (300 mg) by mouth 3 times daily 90 capsule 2     [DISCONTINUED] atenolol (TENORMIN) 25 MG tablet TAKE 1 TABLET BY MOUTH DAILY 30 tablet 0     acyclovir (ZOVIRAX) 800 MG tablet TAKE 1 TABLET BY MOUTH THREE TIMES DAILY AT START OF SYMPTOMS 30 tablet PRN     Acetaminophen (TYLENOL PO)        mometasone (ELOCON) 0.1 % cream Apply  topically daily. 45 g prn        Objective:  /70 (BP Location: Right arm, Cuff Size: Adult Regular)  Pulse 79  Temp 98.3  F (36.8  C) (Oral)  Resp 16  Ht 5' 6.75\" (1.695 m)  Wt 175 lb 8 oz (79.6 kg)  SpO2 98%  BMI 27.69 kg/m2  General Appearance: Pleasant, alert, WN/WD in no acute respiratory distress.  Head Exam: Normal. Normocephalic, atraumatic. No sinus tenderness.  Eye Exam: Normal external eye, conjunctiva, lids. CARMINA.  Ear Exam: Normal auditory canals and external ears. Non-tender.  Left TM-normal. Right TM-normal.  OroPharynx Exam: Dental hygiene adequate. Normal buccal mucosa. Normal pharynx.  Neck Exam: Supple, no masses or enlarged, tender nodes.  Thyroid Exam: No nodules or enlargement or tenderness.  Chest/Respiratory Exam: Normal, comfortable, easy respirations. Chest wall normal. Lungs are clear to auscultation. No wheezing, crackles, or rhonchi.  Cardiovascular Exam: Regular rate and rhythm. No murmur, gallop, or rubs. No pedal edema.  Gastrointestinal Exam: Soft, non-tender, no masses or organomegaly.  Musculoskeletal Exam: Back is non-tender, full ROM of upper and lower extremities.  Left large prepatellar bursitis  Skin: no rash, warm and dry.  Neurologic Exam: Nonfocal, no tremor. Normal gait.  Psychiatric Exam: Alert - appropriate, normal affect    ASSESSMENT/PLAN:    ICD-10-CM    1. Routine history and physical examination of adult Z00.00    2. Hypertension goal BP (blood pressure) < 140/90 I10 atenolol (TENORMIN) 25 MG tablet   3. Gastroesophageal reflux disease without esophagitis K21.9 omeprazole (PRILOSEC) 20 MG CR capsule   4. " "Prepatellar bursitis of left knee M70.42 ORTHOPEDICS ADULT REFERRAL       COUNSELING:  Reviewed preventive health counseling, as reflected in patient instructions       Regular exercise       Healthy diet/nutrition       Colon cancer screening       Prostate cancer screening   Discussed the risks and benefits of prostate cancer screening via PSA, current recommendation from the USPSTF is against screening  But this could be modified by individual risk or family history  Patient declined screening today         reports that he has never smoked. He has never used smokeless tobacco.    Estimated body mass index is 26.15 kg/(m^2) as calculated from the following:    Height as of 6/26/17: 5' 8\" (1.727 m).    Weight as of 6/26/17: 172 lb (78 kg).       Counseling Resources:  ATP IV Guidelines  Pooled Cohorts Equation Calculator  FRAX Risk Assessment  ICSI Preventive Guidelines  Dietary Guidelines for Americans, 2010  USDA's MyPlate  ASA Prophylaxis  Lung CA Screening    Josef Cholo Ray MD  [facility]  "

## 2017-07-11 ENCOUNTER — MYC MEDICAL ADVICE (OUTPATIENT)
Dept: ORTHOPEDICS | Facility: CLINIC | Age: 62
End: 2017-07-11

## 2017-07-11 DIAGNOSIS — M54.42 ACUTE BILATERAL LOW BACK PAIN WITH BILATERAL SCIATICA: Primary | ICD-10-CM

## 2017-07-11 DIAGNOSIS — M54.41 ACUTE BILATERAL LOW BACK PAIN WITH BILATERAL SCIATICA: Primary | ICD-10-CM

## 2017-07-11 NOTE — TELEPHONE ENCOUNTER
Please reach out to patient to inform him that should give Gabapentin a 1 month trial to see if improvement of symptoms noted with use of medication. Can send a prescription for Flexeril to be used at night to help with sleep if the patient is interested.    Aung Randle DO, CAQSM  [facility]

## 2017-07-12 ENCOUNTER — TELEPHONE (OUTPATIENT)
Dept: FAMILY MEDICINE | Facility: CLINIC | Age: 62
End: 2017-07-12

## 2017-07-12 DIAGNOSIS — I10 HYPERTENSION GOAL BP (BLOOD PRESSURE) < 140/90: ICD-10-CM

## 2017-07-12 RX ORDER — CYCLOBENZAPRINE HCL 5 MG
TABLET ORAL
Qty: 45 TABLET | Refills: 0 | Status: SHIPPED | OUTPATIENT
Start: 2017-07-12 | End: 2017-10-20

## 2017-07-12 NOTE — TELEPHONE ENCOUNTER
JF,  Spoke to pharm ([facility]) and the Atenolol is on back order- sounds like a manufacturing issue as no [facility] has any and [facility] has a limited supply per phar,    Requesting a different med sent over.    Please advise.  Thanks,  Barbara Venegas RN

## 2017-07-12 NOTE — TELEPHONE ENCOUNTER
Talked with patient, and he has agreed to give the Gabapentin a trial.     He would also like the Flexeril to help him sleep at night.    Medication is pended, please sign orders. Pharmacy is selected in [facility].

## 2017-07-13 RX ORDER — METOPROLOL SUCCINATE 25 MG/1
25 TABLET, EXTENDED RELEASE ORAL DAILY
Qty: 90 TABLET | Refills: 3 | Status: SHIPPED | OUTPATIENT
Start: 2017-07-13 | End: 2018-04-10

## 2017-07-13 NOTE — TELEPHONE ENCOUNTER
Medication (Flexeril) sent to the patient's pharmacy.  Please inform the patient.    Aung Randle DO, HALM  [facility]

## 2017-07-30 DIAGNOSIS — K21.9 GASTROESOPHAGEAL REFLUX DISEASE WITHOUT ESOPHAGITIS: ICD-10-CM

## 2017-07-31 NOTE — TELEPHONE ENCOUNTER
Omeprazole       Last Written Prescription Date: 07/07/2017  Last Fill Quantity: 180,  # refills: 3   Last Office Visit with G, [facility] or [facility] prescribing provider: 07/07/2017

## 2017-08-07 PROBLEM — M54.50 LOW BACK PAIN: Status: RESOLVED | Noted: 2017-05-08 | Resolved: 2017-08-07

## 2017-08-07 PROBLEM — M54.16 LUMBAR RADICULOPATHY: Status: RESOLVED | Noted: 2017-05-08 | Resolved: 2017-08-07

## 2017-08-07 NOTE — PROGRESS NOTES
Subjective:    HPI                    Objective:    System    Physical Exam    General     ROS    Assessment/Plan:      DISCHARGE REPORT    Progress reporting period is from 5/8/2017 to 5/22/2017.       SUBJECTIVE  Subjective changes noted by patient:  .  Subjective: Sitting still difficult.  No problem gardening or sleeping.  Overall some improvement.    Current pain level is  Current Pain level: 4/10.     Previous pain level was   Initial Pain level: 5/10.   Changes in function:  Yes (See Goal flowsheet attached for changes in current functional level)  Adverse reaction to treatment or activity: None    OBJECTIVE  Changes noted in objective findings:  Yes,   Objective: Suggested he change his seat back in the car to as upright as possible.  ADvanced exercise and added practice with squatting to mimic gardening and lifting     ASSESSMENT/PLAN  Updated problem list and treatment plan: Diagnosis 1:  Back pain  Pain -  manual therapy, self management, education and home program  Decreased ROM/flexibility - manual therapy, therapeutic exercise and home program  Decreased strength - therapeutic exercise, therapeutic activities and home program  Decreased function - therapeutic activities and home program  STG/LTGs have been met or progress has been made towards goals:  Yes (See Goal flow sheet completed today.)  Assessment of Progress: The patient's condition has potential to improve.  The patient has not returned to therapy. Current status is unknown.  Self Management Plans:  Patient has been instructed in a home treatment program.  I have re-evaluated this patient and find that the nature, scope, duration and intensity of the therapy is appropriate for the medical condition of the patient.  Edgardo continues to require the following intervention to meet STG and LTG's:  Patient needs to continue to work on the home exercise program.      Recommendations:  Will discharge since patient has not returned.    Please refer to  the daily flowsheet for treatment today, total treatment time and time spent performing 1:1 timed codes.

## 2017-08-30 ENCOUNTER — HOSPITAL ENCOUNTER (OUTPATIENT)
Dept: MRI IMAGING | Facility: CLINIC | Age: 62
Discharge: HOME | End: 2017-08-30
Attending: PHYSICAL MEDICINE & REHABILITATION | Admitting: PHYSICAL MEDICINE & REHABILITATION
Payer: COMMERCIAL

## 2017-08-30 DIAGNOSIS — M54.42 CHRONIC BILATERAL LOW BACK PAIN WITH BILATERAL SCIATICA: ICD-10-CM

## 2017-08-30 DIAGNOSIS — M54.41 CHRONIC BILATERAL LOW BACK PAIN WITH BILATERAL SCIATICA: ICD-10-CM

## 2017-08-30 DIAGNOSIS — G89.29 CHRONIC BILATERAL LOW BACK PAIN WITH BILATERAL SCIATICA: ICD-10-CM

## 2017-08-30 PROCEDURE — 72148 MRI LUMBAR SPINE W/O DYE: CPT

## 2017-09-07 ENCOUNTER — OFFICE VISIT (OUTPATIENT)
Dept: ORTHOPEDICS | Facility: CLINIC | Age: 62
End: 2017-09-07
Payer: COMMERCIAL

## 2017-09-07 VITALS
HEIGHT: 67 IN | DIASTOLIC BLOOD PRESSURE: 83 MMHG | WEIGHT: 175 LBS | SYSTOLIC BLOOD PRESSURE: 148 MMHG | BODY MASS INDEX: 27.47 KG/M2

## 2017-09-07 DIAGNOSIS — M51.369 LUMBAR DEGENERATIVE DISC DISEASE: ICD-10-CM

## 2017-09-07 DIAGNOSIS — M48.061 LUMBAR FORAMINAL STENOSIS: Primary | ICD-10-CM

## 2017-09-07 PROCEDURE — 99214 OFFICE O/P EST MOD 30 MIN: CPT | Performed by: PHYSICAL MEDICINE & REHABILITATION

## 2017-09-07 NOTE — PROGRESS NOTES
" [facility]   Follow-up Visit s Sep 7, 2017    Subjective:  Edgardo Dominguez is a 62 year old male who is seen in follow up for evaluation of chronic bilateral low back pain with radiation for discussion of MRI of the lumbar spine without contrast results.  Last visit was on 6/26/2017. Since that time, symptoms have been minimally improved since starting the Gabapentin, currently taking 300 mg three times per day. Been using Flexeril and Ibuprofen for improvement of pain/discomfort. Has been continuing with home exercises that were previously prescribed during formal physical therapy.     Notes bilateral low back pain with radiation on the bilateral posterior thigh region. Symptoms are worse with sitting activities. Symptoms are improved by walking activities.  Denies any numbness/tingling in the lower extremities. Denies any weakness of the lower extremities. Denies any bowel/bladder incontinence. Denies any saddle anesthesia. Denies any trauma/falls since last clinical visit.    Patient's past medical, surgical, social, and family histories are reviewed today    Objective:  /83  Ht 5' 6.75\" (1.695 m)  Wt 175 lb (79.4 kg)  BMI 27.61 kg/m2  General: healthy, alert, and in no distress  Skin: no suspicious lesions or rashes  Neuro: sensory and motor exam grossly normal with no focal neurologic deficits appreciated    MSK:    THORACIC/LUMBAR SPINE  Strength:    5/5 - quadriceps, hamstrings, tibialis anterior, gastrocsoleus, and extensor hallicus longus    Imaging:  No x-rays indicated during today's visit  Previous films were reviewed today, independent visualization of images was performed, and results were discussed with the patient  MR LUMBAR SPINE WITHOUT CONTRAST - 8/30/2017   IMPRESSION:   1. Mild or early multilevel degenerative disc disease, greatest in the mid and upper lumbar spine.   2. Mild multilevel foraminal stenosis, greatest at L3-L4. No central " stenosis.    ASSESSMENT:  1. Lumbar foraminal stenosis  2. Lumbar degenerative disc disease    PLAN:  1. Discussed potential side effects and complications of a caudal epidural corticosteroid injection including but not necessarily limited to infection, bleeding, allergic reaction, post-injection flare, local tissue breakdown (including but not limited to potential for skin depigmentation and/or subcutaneous fat atrophy), systemic effects of corticosteroids, elevation of blood glucose, injury to soft tissue and/or nerves and seizure.  Patient indicated their understanding and agreed to proceed.    2. Activity modification as discussed, including limitation of activities that cause pain/discomfort.  3. Acetaminophen/Ibuprofen/ice/heat/Flexeril as needed for improved pain control.  4. Increase Gabapentin to 600 mg 3 times/day for further treatment purposes.  5. Follow up 2 weeks after completion of caudal epidural corticosteroid injection for further evaluation/medical care.  Consider EMG/NCV of the lower extremities, vascular referral/diagnostic testing, spine surgery referral, etc. as deemed appropriate moving forward. Instructed to follow-up if change of symptoms arise.    Patient's conditions were thoroughly discussed during today's visit with greater than 50% of the visit spent counseling the patient with total time spent face-to-face with the patient being 10 minutes.    Aung Randle DO, CAM  [facility]    Disclaimer: This note consists of symbols derived from keyboarding, dictation and/or voice recognition software. As a result, there may be errors in the script that have gone undetected. Please consider this when interpreting information found in this chart.

## 2017-09-07 NOTE — MR AVS SNAPSHOT
After Visit Summary   9/7/2017    Edgardo Dominguez    MRN: [id number]           Patient Information     Date Of Birth          1955        Visit Information        Provider Department      9/7/2017 10:00 AM Aung Randle DO [facility]        Today's Diagnoses     Lumbar foraminal stenosis    -  1    Lumbar degenerative disc disease          Care Instructions    We addressed the following today:    1. Lumbar foraminal stenosis/arthritis    Activity modification as discussed  Home exercise program as instructed  Topical Treatments: Ice or heat  Over the counter medication: Acetaminophen (Tylenol) 1000 mg every 6 hours with food (Maximum of 3000 mg/day)  Ibuprofen (Advil) maximum of 800 mg four times a day with food  Prescription Medication as directed: Increase Gabapentin to 600 mg 3 times/day for further treatment purposes  Caudal epidural corticosteroid injection to be completed at [facility] - call [phone] to schedule  Follow up 2 weeks after completion of caudal epidural corticosteroid injection for further evaluation/medical care (sooner if needed; call direct clinic number [[phone]] at any time with questions or concerns)              Follow-ups after your visit        Future tests that were ordered for you today     Open Future Orders        Priority Expected Expires Ordered    IR Caudal Epidural Injection Single Routine  9/7/2018 9/7/2017            Who to contact     If you have questions or need follow up information about today's clinic visit or your schedule please contact [facility] directly at [phone].  Normal or non-critical lab and imaging results will be communicated to you by MyChart, letter or phone within 4 business days after the clinic has received the results. If you do not hear from us within 7 days, please contact the clinic through MyChart or phone. If you have a critical or abnormal lab result, we will notify you by  "phone as soon as possible.  Submit refill requests through Imagistx or call your pharmacy and they will forward the refill request to us. Please allow 3 business days for your refill to be completed.          Additional Information About Your Visit        [employer] Information     Imag[employer]x gives you secure access to your electronic health record. If you see a primary care provider, you can also send messages to your care team and make appointments. If you have questions, please call your primary care clinic.  If you do not have a primary care provider, please call [phone] and they will assist you.        Care EveryWhere ID     This is your Care EveryWhere ID. This could be used by other organizations to access your [facility] medical records  [id number]        Your Vitals Were     Height BMI (Body Mass Index)                5' 6.75\" (1.695 m) 27.61 kg/m2           Blood Pressure from Last 3 Encounters:   09/07/17 148/83   07/07/17 110/70   06/26/17 131/82    Weight from Last 3 Encounters:   09/07/17 175 lb (79.4 kg)   07/07/17 175 lb 8 oz (79.6 kg)   06/26/17 172 lb (78 kg)               Primary Care Provider Office Phone # Fax #    Josef Cholo Ray [phone] [address] [facility] [address]        Equal Access to Services     TRISTAN SANTIAGO : Hadii aad ku hadasho Soomaali, waaxda luqadaha, qaybta kaalmada adeegyada, mahsa suresh. So [facility] [phone].    ATENCIÓN: Si habla español, tiene a raygoza disposición servicios gratuitos de asistencia lingüística. Llame al [phone].    We comply with applicable federal civil rights laws and [place] laws. We do not discriminate on the basis of race, color, national origin, age, disability sex, sexual orientation or gender identity.            Thank you!     Thank you for choosing [facility]  for your care. Our goal is always to provide you with excellent care. Hearing back from our " patients is one way we can continue to improve our services. Please take a few minutes to complete the written survey that you may receive in the mail after your visit with us. Thank you!             Your Updated Medication List - Protect others around you: Learn how to safely use, store and throw away your medicines at www.disposemymeds.org.          This list is accurate as of: 9/7/17 10:32 AM.  Always use your most recent med list.                   Brand Name Dispense Instructions for use Diagnosis    acyclovir 800 MG tablet    ZOVIRAX    30 tablet    TAKE 1 TABLET BY MOUTH THREE TIMES DAILY AT START OF SYMPTOMS    Herpes simplex infection of genitourinary system       cyclobenzaprine 5 MG tablet    FLEXERIL    45 tablet    Take 1-2 tabs as needed at bedtime for pain interrupting sleep    Acute bilateral low back pain with bilateral sciatica       gabapentin 300 MG capsule    NEURONTIN    90 capsule    Take 1 capsule (300 mg) by mouth 3 times daily    Acute bilateral low back pain with bilateral sciatica       metoprolol 25 MG 24 hr tablet    TOPROL-XL    90 tablet    Take 1 tablet (25 mg) by mouth daily    Hypertension goal BP (blood pressure) < 140/90       mometasone 0.1 % cream    ELOCON    45 g    Apply  topically daily.    Eczema       omeprazole 20 MG CR capsule    priLOSEC    180 capsule    Take 1 capsule (20 mg) by mouth 2 times daily    Gastroesophageal reflux disease without esophagitis       TYLENOL PO

## 2017-09-07 NOTE — PATIENT INSTRUCTIONS
We addressed the following today:    1. Lumbar foraminal stenosis/arthritis    Activity modification as discussed  Home exercise program as instructed  Topical Treatments: Ice or heat  Over the counter medication: Acetaminophen (Tylenol) 1000 mg every 6 hours with food (Maximum of 3000 mg/day)  Ibuprofen (Advil) maximum of 800 mg four times a day with food  Prescription Medication as directed: Increase Gabapentin to 600 mg 3 times/day for further treatment purposes  Caudal epidural corticosteroid injection to be completed at [facility] - call [phone] to schedule  Follow up 2 weeks after completion of caudal epidural corticosteroid injection for further evaluation/medical care (sooner if needed; call direct clinic number [[phone]] at any time with questions or concerns)

## 2017-09-07 NOTE — NURSING NOTE
"Chief Complaint   Patient presents with     RECHECK     chronic back pain with radiation, MRI results       Initial /83  Ht 5' 6.75\" (1.695 m)  Wt 175 lb (79.4 kg)  BMI 27.61 kg/m2 Estimated body mass index is 27.61 kg/(m^2) as calculated from the following:    Height as of this encounter: 5' 6.75\" (1.695 m).    Weight as of this encounter: 175 lb (79.4 kg).  Medication Reconciliation: complete     James Cedeño, ATC      "

## 2017-09-15 ENCOUNTER — TRANSFERRED RECORDS (OUTPATIENT)
Dept: HEALTH INFORMATION MANAGEMENT | Facility: CLINIC | Age: 62
End: 2017-09-15

## 2017-09-15 ENCOUNTER — MYC REFILL (OUTPATIENT)
Dept: ORTHOPEDICS | Facility: CLINIC | Age: 62
End: 2017-09-15

## 2017-09-15 DIAGNOSIS — M54.42 ACUTE BILATERAL LOW BACK PAIN WITH BILATERAL SCIATICA: ICD-10-CM

## 2017-09-15 DIAGNOSIS — M48.061 LUMBAR FORAMINAL STENOSIS: Primary | ICD-10-CM

## 2017-09-15 DIAGNOSIS — M54.41 ACUTE BILATERAL LOW BACK PAIN WITH BILATERAL SCIATICA: ICD-10-CM

## 2017-09-15 RX ORDER — GABAPENTIN 600 MG/1
600 TABLET ORAL 3 TIMES DAILY
Qty: 90 TABLET | Refills: 1 | Status: SHIPPED | OUTPATIENT
Start: 2017-09-15 | End: 2018-04-10

## 2017-09-15 RX ORDER — GABAPENTIN 300 MG/1
300 CAPSULE ORAL 3 TIMES DAILY
Qty: 90 CAPSULE | Refills: 2 | Status: CANCELLED | OUTPATIENT
Start: 2017-09-15

## 2017-09-15 NOTE — TELEPHONE ENCOUNTER
Message from [employer]:  Original authorizing provider: DO Jairo Humphrey would like a refill of the following medications:  gabapentin (NEURONTIN) 300 MG capsule [Aung Randle DO]    Preferred pharmacy: [facility] [address] [place] [address], [place] - [address] [facility] [address]    Comment:  With the increase in the medication from 900 MG to 1800 MG, I am running out of medication. I have a two day supply left. Jairo Dominguez

## 2017-09-15 NOTE — TELEPHONE ENCOUNTER
Medication Request for: gabapentin            Patient currently takinmg three times daily   With increase in dose, is running out of old prescription.   Problems/ Concerns of Patient: no side effects reported  Prescription last written on 17 by Dr. Randle    Last Fill Quantity: 90 with # refills: 2  Sig: take 300mg three times daily     Last Office Visit by provider: 17  Future Office Visit:   Visit date not found  Patient desires to have faxed or E-prescribe to pharmacy if available  Pharmacy selected in [facility].    Medication requests may take up to 3 business days for a response  Has patient been notified of this Unknown   medication    Please advise.     MIKHAIL Gomez RN

## 2017-09-20 ENCOUNTER — TELEPHONE (OUTPATIENT)
Dept: ORTHOPEDICS | Facility: CLINIC | Age: 62
End: 2017-09-20

## 2017-09-20 NOTE — TELEPHONE ENCOUNTER
Please have the patient call [facility] to inform of the below and see if they have any additional recommendations as they were the ones that completed his corticosteroid injection.  Would recommend symptomatic cares including Acetaminophen/Ibuprofen/ice/heat/Flexeril, continue with pain free home exercise program from formal physical therapy, and activity modification for improvement of symptoms until follow-up on 9/25/2017.    Aung Randle DO, CAQSM  [facility]

## 2017-09-20 NOTE — TELEPHONE ENCOUNTER
Patient called stating he is having increased pain after his injection on 9/15 at [facility].    Patient states he has pain in his left glute with occasional tingling down his leg. The patient is having trouble sleeping as well.    He also feels more lightheaded, relating this to the increase in gabapentin.     Patient is scheduled for a follow up on Monday 9/25/2017.     Please advise if there is anything else to do in between his appt.

## 2017-09-25 ENCOUNTER — OFFICE VISIT (OUTPATIENT)
Dept: ORTHOPEDICS | Facility: CLINIC | Age: 62
End: 2017-09-25
Payer: COMMERCIAL

## 2017-09-25 VITALS
DIASTOLIC BLOOD PRESSURE: 80 MMHG | BODY MASS INDEX: 27.47 KG/M2 | HEIGHT: 67 IN | WEIGHT: 175 LBS | SYSTOLIC BLOOD PRESSURE: 132 MMHG

## 2017-09-25 DIAGNOSIS — M48.061 LUMBAR FORAMINAL STENOSIS: ICD-10-CM

## 2017-09-25 DIAGNOSIS — M51.369 LUMBAR DEGENERATIVE DISC DISEASE: ICD-10-CM

## 2017-09-25 DIAGNOSIS — G89.29 CHRONIC BILATERAL LOW BACK PAIN WITH BILATERAL SCIATICA: Primary | ICD-10-CM

## 2017-09-25 DIAGNOSIS — M54.41 CHRONIC BILATERAL LOW BACK PAIN WITH BILATERAL SCIATICA: Primary | ICD-10-CM

## 2017-09-25 DIAGNOSIS — M54.42 CHRONIC BILATERAL LOW BACK PAIN WITH BILATERAL SCIATICA: Primary | ICD-10-CM

## 2017-09-25 PROCEDURE — 99214 OFFICE O/P EST MOD 30 MIN: CPT | Performed by: PHYSICAL MEDICINE & REHABILITATION

## 2017-09-25 NOTE — PATIENT INSTRUCTIONS
We addressed the following today:    1. Chronic bilateral low back pain with radiation  2. Lumbar spinal stenosis    Activity modification as discussed  Home exercise program as instructed  Topical Treatments: Ice or heat  Over the counter medication: Acetaminophen (Tylenol) 1000 mg every 6 hours with food (Maximum of 3000 mg/day)  Ibuprofen (Advil) maximum of 800 mg four times a day with food  Prescription Medication as directed: Gabapentin and Flexeril  Other specific instructions: Rodolfo Arnett MD - Certified Medical Evaluations - call [phone] to schedule EMG/NCV of the bilateral lower extremities  Follow up/call 2-3 business days after completion of further diagnostic testing for discussion of results/further medical care (sooner if needed; call direct clinic number [[phone]] at any time with questions or concerns)

## 2017-09-25 NOTE — MR AVS SNAPSHOT
After Visit Summary   9/25/2017    Edgardo Dominguez    MRN: [id number]           Patient Information     Date Of Birth          1955        Visit Information        Provider Department      9/25/2017 10:20 AM Aung Randle DO [facility] SP[facility] MEDICINE        Today's Diagnoses     Chronic bilateral low back pain with bilateral sciatica    -  1    Lumbar foraminal stenosis        Lumbar degenerative disc disease          Care Instructions    We addressed the following today:    1. Chronic bilateral low back pain with radiation  2. Lumbar spinal stenosis    Activity modification as discussed  Home exercise program as instructed  Topical Treatments: Ice or heat  Over the counter medication: Acetaminophen (Tylenol) 1000 mg every 6 hours with food (Maximum of 3000 mg/day)  Ibuprofen (Advil) maximum of 800 mg four times a day with food  Prescription Medication as directed: Gabapentin and Flexeril  Other specific instructions: Rodolfo Arnett MD - Certified Medical Evaluations - call [phone] to schedule EMG/NCV of the bilateral lower extremities  Follow up/call 2-3 business days after completion of further diagnostic testing for discussion of results/further medical care (sooner if needed; call direct clinic number [[phone]] at any time with questions or concerns)              Follow-ups after your visit        Additional Services     ORTHO  REFERRAL       Coverage of these services is subject to the terms and limitations of your health insurance plan. Please call member services at your health plan with any benefit or coverage questions.       [facility] is referring you to the Orthopedic  Services at [facility].       The  representative will assist you in the coordination of your orthopedic and musculoskeletal care as prescribed by your physician.    The  representative will call you within 24 hours or   You may  contact the Pending sale to [facility] representative at:   [phone] for [place] and [facility]  [phone] for [facility], and [facility]  [phone] for [facility]    Type of Referral : Rodolfo Arnett MD - Certified Medical Evaluations - call [phone] to schedule    EMG/NCV of the lower extremities - evaluate for lumbar spinal stenosis/lumbosacral radiculopathy, etc.       Timeframe requested: Routine       If X-rays, CT or MRI's   have been performed, please contact the facility where they were done, to arrange for  prior to your scheduled appointment. Please bring this referral request to your appointment and present it to your specialist.                  Who to contact     If you have questions or need follow up information about today's clinic visit or your schedule please contact [facility]IC[facility] directly at [phone].  Normal or non-critical lab and imaging results will be communicated to you by Groupspeakhart, letter or phone within 4 business days after the clinic has received the results. If you do not hear from us within 7 days, please contact the clinic through StyleSeekt or phone. If you have a critical or abnormal lab result, we will notify you by phone as soon as possible.  Submit refill requests through Musicplayr or call your pharmacy and they will forward the refill request to us. Please allow 3 business days for your refill to be completed.          Additional Information About Your Visit        [employer] Information     [employer] gives you secure access to your electronic health record. If you see a primary care provider, you can also send messages to your care team and make appointments. If you have questions, please call your primary care clinic.  If you do not have a primary care provider, please call [phone] and they will assist you.        Care EveryWhere ID     This is your Care EveryWhere ID. This could be used by other organizations to access your [facility]  "records  [id number]        Your Vitals Were     Height BMI (Body Mass Index)                5' 6.75\" (1.695 m) 27.61 kg/m2           Blood Pressure from Last 3 Encounters:   09/25/17 132/80   09/07/17 148/83   07/07/17 110/70    Weight from Last 3 Encounters:   09/25/17 175 lb (79.4 kg)   09/07/17 175 lb (79.4 kg)   07/07/17 175 lb 8 oz (79.6 kg)              We Performed the Following     ORTHO  REFERRAL        Primary Care Provider Office Phone # Fax #    Josef Cholo Ray [phone] [address] [facility] [address]        Equal Access to Services     TRISTAN SANTIAGO : Ernesto williso Sojael, waaxda luqadaha, qaybta kaalmada adeegyada, mahsa tran . So [facility] [phone].    ATENCIÓN: Si habla español, tiene a raygoza disposición servicios gratuitos de asistencia lingüística. [facility] [phone].    We comply with applicable federal civil rights laws and [place] laws. We do not discriminate on the basis of race, color, national origin, age, disability sex, sexual orientation or gender identity.            Thank you!     Thank you for choosing [facility]  for your care. Our goal is always to provide you with excellent care. Hearing back from our patients is one way we can continue to improve our services. Please take a few minutes to complete the written survey that you may receive in the mail after your visit with us. Thank you!             Your Updated Medication List - Protect others around you: Learn how to safely use, store and throw away your medicines at www.disposemymeds.org.          This list is accurate as of: 9/25/17 10:58 AM.  Always use your most recent med list.                   Brand Name Dispense Instructions for use Diagnosis    acyclovir 800 MG tablet    ZOVIRAX    30 tablet    TAKE 1 TABLET BY MOUTH THREE TIMES DAILY AT START OF SYMPTOMS    Herpes simplex infection of genitourinary system       " cyclobenzaprine 5 MG tablet    FLEXERIL    45 tablet    Take 1-2 tabs as needed at bedtime for pain interrupting sleep    Acute bilateral low back pain with bilateral sciatica       * gabapentin 300 MG capsule    NEURONTIN    90 capsule    Take 1 capsule (300 mg) by mouth 3 times daily    Acute bilateral low back pain with bilateral sciatica       * gabapentin 600 MG tablet    NEURONTIN    90 tablet    Take 1 tablet (600 mg) by mouth 3 times daily    Lumbar foraminal stenosis       metoprolol 25 MG 24 hr tablet    TOPROL-XL    90 tablet    Take 1 tablet (25 mg) by mouth daily    Hypertension goal BP (blood pressure) < 140/90       mometasone 0.1 % cream    ELOCON    45 g    Apply  topically daily.    Eczema       omeprazole 20 MG CR capsule    priLOSEC    180 capsule    Take 1 capsule (20 mg) by mouth 2 times daily    Gastroesophageal reflux disease without esophagitis       TYLENOL PO           * Notice:  This list has 2 medication(s) that are the same as other medications prescribed for you. Read the directions carefully, and ask your doctor or other care provider to review them with you.

## 2017-09-25 NOTE — NURSING NOTE
"Chief Complaint   Patient presents with     RECHECK     chronic low back pain with radiation       Initial /80  Ht 5' 6.75\" (1.695 m)  Wt 175 lb (79.4 kg)  BMI 27.61 kg/m2 Estimated body mass index is 27.61 kg/(m^2) as calculated from the following:    Height as of this encounter: 5' 6.75\" (1.695 m).    Weight as of this encounter: 175 lb (79.4 kg).  Medication Reconciliation: complete     James Cedeño, ATC      "

## 2017-09-25 NOTE — PROGRESS NOTES
" [facility]   Follow-up Visit s Sep 25, 2017    Subjective:  Edgardo Dominguez is a 62 year old male who is seen in follow up for evaluation of chronic low back pain with radiation.  Last visit was on 9/7/2017 with an L5-S1 interlaminar epidural corticosteroid injection completed on 9/15/2017 at [facility] with worsening of pain after the injection that lasted for 3-4 days. Since that time the pain has returned to his baseline status. Been using Gabapentin, Ibuprofen, and occasional Flexeril for improvement of symptoms.  Has been completing his home exercise program as previously prescribed during formal physical therapy.    Notes low back pain with radiation into the bilateral posterior thighs stopping at the knees. Symptoms are worse with sitting activities. Symptoms are improved by walking/changing positions.  Notes occasional tingling of the left lateral thigh/leg. Denies any weakness of the lower extremities. Denies any bowel/bladder incontinence. Denies any saddle anesthesia. Denies any trauma/falls since last clinical visit.    Patient's past medical, surgical, social, and family histories are reviewed today    Objective:  /80  Ht 5' 6.75\" (1.695 m)  Wt 175 lb (79.4 kg)  BMI 27.61 kg/m2  General: healthy, alert, and in no distress  Skin: no suspicious lesions or rashes  Neuro: motor exam grossly normal with no focal neurologic deficits appreciated    MSK:    THORACIC/LUMBAR SPINE  Strength:    5/5 - quadriceps, hamstrings, tibialis anterior, gastrocsoleus, and extensor hallicus longus  Special Tests:    None    Imaging:  No x-rays indicated during today's visit  Previous report was reviewed today and results were discussed the patient    ASSESSMENT:  1. Chronic low back pain with bilateral sciatica   2. Lumbar foraminal stenosis  3. Lumbar degenerative disc disease     PLAN:  1. EMG/NCV of the bilateral lower extremities for further evaluation of current medical state.  2. Activity " modification as discussed, including limitation of activities that cause pain/discomfort.  3. Acetaminophen/Ibuprofen/ice/heat/Flexeril as needed for improved pain control.  4. Continue with Gabapentin to 600 mg 3 times/day for further treatment purposes.  5. Follow up/call 2-3 business days after completion of further diagnostic testing for discussion of results/further medical care.  Consider a caudal epidural corticosteroid injection, vascular referral/diagnostic testing, spine surgery referral, etc. as deemed appropriate moving forward. Instructed to follow-up if change of symptoms arise.    Patient's conditions were thoroughly discussed during today's visit with greater than 50% of the visit spent counseling the patient with total time spent face-to-face with the patient being 10 minutes.    Aung Randle DO, [facility]  [facility]    Disclaimer: This note consists of symbols derived from keyboarding, dictation and/or voice recognition software. As a result, there may be errors in the script that have gone undetected. Please consider this when interpreting information found in this chart.

## 2017-09-25 NOTE — Clinical Note
Dear Edgardo Alanis [place] [facility] [place].  Please refer to the visit note at your convenience and feel free to contact me should you have any questions.  Sincerely,  Aung Randle DO, [facility]

## 2017-10-04 ENCOUNTER — TRANSFERRED RECORDS (OUTPATIENT)
Dept: HEALTH INFORMATION MANAGEMENT | Facility: CLINIC | Age: 62
End: 2017-10-04

## 2017-10-20 ENCOUNTER — TELEPHONE (OUTPATIENT)
Dept: ORTHOPEDICS | Facility: CLINIC | Age: 62
End: 2017-10-20

## 2017-10-20 ENCOUNTER — OFFICE VISIT (OUTPATIENT)
Dept: ORTHOPEDICS | Facility: CLINIC | Age: 62
End: 2017-10-20
Payer: COMMERCIAL

## 2017-10-20 ENCOUNTER — TELEPHONE (OUTPATIENT)
Dept: PALLIATIVE MEDICINE | Facility: CLINIC | Age: 62
End: 2017-10-20

## 2017-10-20 VITALS — HEIGHT: 68 IN | BODY MASS INDEX: 26.98 KG/M2 | WEIGHT: 178 LBS | RESPIRATION RATE: 15 BRPM

## 2017-10-20 DIAGNOSIS — M48.062 SPINAL STENOSIS OF LUMBAR REGION WITH NEUROGENIC CLAUDICATION: ICD-10-CM

## 2017-10-20 DIAGNOSIS — M51.369 LUMBAR DEGENERATIVE DISC DISEASE: ICD-10-CM

## 2017-10-20 DIAGNOSIS — M54.41 CHRONIC BILATERAL LOW BACK PAIN WITH BILATERAL SCIATICA: Primary | ICD-10-CM

## 2017-10-20 DIAGNOSIS — M54.42 CHRONIC BILATERAL LOW BACK PAIN WITH BILATERAL SCIATICA: Primary | ICD-10-CM

## 2017-10-20 DIAGNOSIS — G89.29 CHRONIC BILATERAL LOW BACK PAIN WITH BILATERAL SCIATICA: Primary | ICD-10-CM

## 2017-10-20 PROCEDURE — 99213 OFFICE O/P EST LOW 20 MIN: CPT | Performed by: PHYSICAL MEDICINE & REHABILITATION

## 2017-10-20 RX ORDER — CYCLOBENZAPRINE HCL 5 MG
TABLET ORAL
Qty: 45 TABLET | Refills: 1 | Status: SHIPPED | OUTPATIENT
Start: 2017-10-20 | End: 2018-11-29

## 2017-10-20 NOTE — PATIENT INSTRUCTIONS
We addressed the following today:    1. Lumbar spinal stenosis  2. Lumbar arthritis    Activity modification as discussed  Home exercise program as instructed  Topical Treatments: Ice or heat  Over the counter medication: Acetaminophen (Tylenol) 1000 mg every 6 hours with food (Maximum of 3000 mg/day)  Ibuprofen (Advil) maximum of 800 mg four times a day with food  Prescription Medication as directed: Increase Gabapentin to 900 mg three times daily for treatment purposes and Flexeril  Epidural corticosteroid injection to be completed at [facility]  Follow up 2 weeks after completion of corticosteroid injection if no improvement of symptoms for further evaluation/medical care (sooner if needed; call direct clinic number [[phone]] at any time with questions or concerns)

## 2017-10-20 NOTE — PROGRESS NOTES
" [facility]   Follow-up Visit s Oct 20, 2017    Subjective:  Edgardo Dominguez is a 62 year old male who is seen in follow up for evaluation of bilateral low back pain for discussion of EMG/NCV of the lower extremity results.  Last visit was on 9/25/2017.  Since that time, symptoms have been about the same.  Has continued with use of Gabapentin as previously prescribed and for further treatment purposes. Has continued with his home exercise program as previously prescribed during formal physical therapy.    Notes bilateral low back pain with radiation present down the posterior thighs stopping at the knees. Symptoms are worse with sitting activities.  Denies any numbness/tingling/weakness of the lower extremities. Denies any bowel/bladder incontinence. Denies any saddle anesthesia. Denies any trauma/falls since last clinical visit.    Patient's past medical, surgical, social, and family histories are reviewed today    Objective:  Resp 15  Ht 5' 8\" (1.727 m)  Wt 178 lb (80.7 kg)  BMI 27.06 kg/m2  General: healthy, alert, and in no distress    Diagnostic Testing/Imaging:  No x-rays indicated during today's visit  Previous report was reviewed today and results were discussed the patient  Outside report from [facility] was reviewed today and results were discussed with the patient  EMG/NCV of the Bilateral Lower Extremities - 10/4/2017  Clinical Interpretation:  1. There are no abnormalities present on this EMG examination. There may be nerve root irritation from disc or spur but there is no electrodiagnostic evidence for any recent or chronic axonal loss in the lower extremities.    ASSESSMENT:  1. Chronic bilateral low back pain with bilateral sciatica  2. Lumbar spinal stenosis  3. Lumbar degenerative disc disease    PLAN:  1. Discussed potential side effects and complications of a caudal epidural corticosteroid injection including but not necessarily limited to " infection, bleeding, allergic reaction, post-injection flare, local tissue breakdown (including but not limited to potential for skin depigmentation and/or subcutaneous fat atrophy), systemic effects of corticosteroids, elevation of blood glucose, injury to soft tissue and/or nerves and seizure.  Patient indicated their understanding and agreed to proceed.    2. Activity modification as discussed, including limitation of activities that cause pain/discomfort.  3. Acetaminophen/Ibuprofen/ice/heat as needed for improved pain control.  4. Refill of Flexeril given to the patient to be taken at night as needed for improvement of symptoms.  5. Increase Gabapentin to 900 mg 3 times/day for further treatment purposes.  6. Follow up 2 weeks after completion of corticosteroid injection if no improvement of symptoms for further evaluation/medical care.  Consider MRI of the hamstrings without contrast, rheumatologic laboratory testing/referral, vascular testing/referral etc. as deemed appropriate moving forward. Instructed to follow-up if change of symptoms arise.    Patient's conditions were thoroughly discussed during today's visit with greater than 50% of the visit spent counseling the patient with total time spent face-to-face with the patient being 15 minutes.    Aung Randle DO, [facility]  [facility]    Disclaimer: This note consists of symbols derived from keyboarding, dictation and/or voice recognition software. As a result, there may be errors in the script that have gone undetected. Please consider this when interpreting information found in this chart.

## 2017-10-20 NOTE — TELEPHONE ENCOUNTER
Patient left voicemail stating he saw Dr. Randle today and was to get an injection set up on 11/3/17 with Dr. Randle. He had not gotten a call yet and wasn't sure if he misunderstood. Didn't want to leave to run errands if they were going to call.     Phone call to patient. He states he already received a message from Pain and he will get back with them on Monday.     MIKHAIL Gomez RN

## 2017-10-20 NOTE — NURSING NOTE
"Chief Complaint   Patient presents with     RECHECK     bilateral low back pain       Initial Resp 15  Ht 5' 8\" (1.727 m)  Wt 178 lb (80.7 kg)  BMI 27.06 kg/m2 Estimated body mass index is 27.06 kg/(m^2) as calculated from the following:    Height as of this encounter: 5' 8\" (1.727 m).    Weight as of this encounter: 178 lb (80.7 kg).  Medication Reconciliation: complete     Demarcus Rojas ATC    "

## 2017-10-20 NOTE — MR AVS SNAPSHOT
After Visit Summary   10/20/2017    Edgardo Dominguez    MRN: [id number]           Patient Information     Date Of Birth          1955        Visit Information        Provider Department      10/20/2017 10:00 AM Aung Randle DO [facility]        Today's Diagnoses     Chronic bilateral low back pain with bilateral sciatica    -  1    Spinal stenosis of lumbar region with neurogenic claudication        Lumbar degenerative disc disease          Care Instructions    We addressed the following today:    1. Lumbar spinal stenosis  2. Lumbar arthritis    Activity modification as discussed  Home exercise program as instructed  Topical Treatments: Ice or heat  Over the counter medication: Acetaminophen (Tylenol) 1000 mg every 6 hours with food (Maximum of 3000 mg/day)  Ibuprofen (Advil) maximum of 800 mg four times a day with food  Prescription Medication as directed: Increase Gabapentin to 900 mg three times daily for treatment purposes and Flexeril  Epidural corticosteroid injection to be completed at [facility]  Follow up 2 weeks after completion of corticosteroid injection if no improvement of symptoms for further evaluation/medical care (sooner if needed; call direct clinic number [[phone]] at any time with questions or concerns)              Follow-ups after your visit        Additional Services     PAIN MANAGEMENT REFERRAL       [facility] Referral    Please be aware that coverage of these services is subject to the terms and limitations of your health insurance plan.  Call member services at your health plan with any benefit or coverage questions.      Please bring the following to your appointment:  Any x-rays, CTs or MRIs which have been performed.  Contact the facility where they were done to arrange for  prior to your scheduled appointment.  Any new CT, MRI or other procedures ordered by your specialist must be  performed at a [facility] facility or coordinated by your clinic's referral office.    List of current medications   This referral request  Any documents/labs given to you for this referral      Reason for Consult:  Procedure or injection only - patient will be contacted within 24 hrs to schedule: Caudal epidural corticosteroid injection - 11/2 - Dr. Randle    Please answer the following questions:    What is your diagnosis for this patient's pain?  Lumbar spinal stenosis    Do you have any specific questions for the pain specialist? No    Are there any red flags that may impact the assessment or management of this patient?    None    ANY DIAGNOSTIC TESTS THAT ARE NOT IN EPIC SHOULD BE SENT TO THE PAIN CENTER    Please note the pre op pain consult must be scheduled 2-3 weeks prior to the patient's surgery. Patient's trying to schedule within 2 weeks of surgery may not be accommodated.    Pre Op Pain Consults are only good for 30 days.    REGARDING OPIOID MEDICATIONS:  We will always address appropriateness of opioid pain medications, but we generally will not automatically take on a prescribing role. When we do take on prescribing of opioids for chronic pain, it is in collaboration with the referring physician for an intermediate period of time (months), with an expectation that the primary physician or provider will assume the prescribing role if medications are effective at stable doses with demonstrated compliance.  Therefore, please do not assume that your prescribing responsibilities end on the day of pain clinic consultation.  Is this agreeable to you? YES    For any questions, contact the [facility] at [phone]                  Who to contact     If you have questions or need follow up information about today's clinic visit or your schedule please contact [facility] directly at [phone].  Normal or non-critical lab and imaging results will be communicated to you by  "MyChart, letter or phone within 4 business days after the clinic has received the results. If you do not hear from us within 7 days, please contact the clinic through Yones[facility] or phone. If you have a critical or abnormal lab result, we will notify you by phone as soon as possible.  Submit refill requests through L2C or call your pharmacy and they will forward the refill request to us. Please allow 3 business days for your refill to be completed.          Additional Information About Your Visit        Yoneshart Information     L2C gives you secure access to your electronic health record. If you see a primary care provider, you can also send messages to your care team and make appointments. If you have questions, please call your primary care clinic.  If you do not have a primary care provider, please call [phone] and they will assist you.        Care EveryWhere ID     This is your Care EveryWhere ID. This could be used by other organizations to access your [facility] medical records  [id number]        Your Vitals Were     Respirations Height BMI (Body Mass Index)             15 5' 8\" (1.727 m) 27.06 kg/m2          Blood Pressure from Last 3 Encounters:   09/25/17 132/80   09/07/17 148/83   07/07/17 110/70    Weight from Last 3 Encounters:   10/20/17 178 lb (80.7 kg)   09/25/17 175 lb (79.4 kg)   09/07/17 175 lb (79.4 kg)              We Performed the Following     PAIN MANAGEMENT REFERRAL          Where to get your medicines      These medications were sent to [id number] - [place], [place] - [address]  [address], [place] [address]    Hours:  24-hours Phone:  [phone]     cyclobenzaprine 5 MG tablet          Primary Care Provider Office Phone # Fax #    Josef Cholo Ray [phone] [address]  [facility] [address]        Equal Access to Services     TRISTAN SANTIAGO AH: Hadii son hinson hadasho Soomaali, waaxda luqadaha, qaybta " mahsa jacobsmichael tran ah. Elana [facility] [phone].    ATENCIÓN: Si kelli sparks, tiene a raygoza disposición servicios gratuitos de asistencia lingüística. Bryant al [phone].    We comply with applicable federal civil rights laws and [place] laws. We do not discriminate on the basis of race, color, national origin, age, disability, sex, sexual orientation, or gender identity.            Thank you!     Thank you for choosing [facility]  for your care. Our goal is always to provide you with excellent care. Hearing back from our patients is one way we can continue to improve our services. Please take a few minutes to complete the written survey that you may receive in the mail after your visit with us. Thank you!             Your Updated Medication List - Protect others around you: Learn how to safely use, store and throw away your medicines at www.disposemymeds.org.          This list is accurate as of: 10/20/17 11:18 AM.  Always use your most recent med list.                   Brand Name Dispense Instructions for use Diagnosis    acyclovir 800 MG tablet    ZOVIRAX    30 tablet    TAKE 1 TABLET BY MOUTH THREE TIMES DAILY AT START OF SYMPTOMS    Herpes simplex infection of genitourinary system       cyclobenzaprine 5 MG tablet    FLEXERIL    45 tablet    Take 1-2 tabs as needed at bedtime for pain interrupting sleep        * gabapentin 300 MG capsule    NEURONTIN    90 capsule    Take 1 capsule (300 mg) by mouth 3 times daily    Acute bilateral low back pain with bilateral sciatica       * gabapentin 600 MG tablet    NEURONTIN    90 tablet    Take 1 tablet (600 mg) by mouth 3 times daily    Lumbar foraminal stenosis       metoprolol 25 MG 24 hr tablet    TOPROL-XL    90 tablet    Take 1 tablet (25 mg) by mouth daily    Hypertension goal BP (blood pressure) < 140/90       mometasone 0.1 % cream    ELOCON    45 g    Apply  topically daily.    Eczema        omeprazole 20 MG CR capsule    priLOSEC    180 capsule    Take 1 capsule (20 mg) by mouth 2 times daily    Gastroesophageal reflux disease without esophagitis       TYLENOL PO           * Notice:  This list has 2 medication(s) that are the same as other medications prescribed for you. Read the directions carefully, and ask your doctor or other care provider to review them with you.

## 2017-10-20 NOTE — TELEPHONE ENCOUNTER
LM for patient to schedule Caudal ROBERT w/Dr. Randle (On 11/2 per order).    Veronica Cooper    [facility]

## 2017-10-20 NOTE — Clinical Note
Dear Edgardo Alanis saw [place] [facility] [place].  Please refer to the visit note at your convenience and feel free to contact me should you have any questions.  Sincerely,  Aung Randle DO, [facility]

## 2017-10-23 NOTE — TELEPHONE ENCOUNTER
Pre-screening Questions for Radiology Injections:    Injection to be done at which interventional clinic site? [facility]    Procedure ordered by Jer    Procedure ordered? Caudal Epidural Steroid Injection    What insurance would patient like us to bill for this procedure? [employer]      Worker's comp-Any injection DO NOT SCHEDULE and route to Gisel Guardado.      [employer] insurance - For SI joint injections, DO NOT SCHEDULE and route Ksenia Pelaez.      [employer]- MBB's must be scheduled at LEAST two weeks apart      [employer] - Any injection besides hip/shoulder/knee joint DO NOT SCHEDULE and route to Ksenia Pelaez. She will obtain PA and call pt back to schedule procedure or notify pt of denial.     HP [employer]-PA REQUIRED FOR NON-ROBERT OR Joint injections    Any chance of pregnancy? Not Applicable   If YES, do NOT schedule and route to RN pool    Is an  needed? No     Patient has a drive home? (mandatory) YES:     Is patient taking any blood thinners (plavix, coumadin, jantoven, warfarin, heparin, pradaxa or dabigatran )? No   If hold needed, do NOT schedule, route to RN pool     Is patient taking any aspirin products? No     If more than 325mg/day do NOT schedule; route to RN pool     For CERVICAL procedures, hold all aspirin products for 6 days.      Does the patient have a bleeding or clotting disorder? No     If YES, okay to schedule AND route to RN nurse pool    **For any patients with platelet count <100, must be forwarded to provider**    Is patient diabetic?  No  If YES, have them bring their glucometer.    Does patient have an active infection or treated for one within the past week? No     Is patient currently taking any antibiotics?  No     For patients on chronic, preventative, or prophylactic antibiotics, procedures may be scheduled.     For patients on antibiotics for active or recent infection:    Jose aMck Burton, Snitzer-antibiotic course must have  been completed for 4 days    Dr. Randle-antibiotic course must have been completed for 7 days    Is patient currently taking any steroid medications? (i.e. Prednisone, Medrol)  No     For patients on steroid medications:    Jose Mack Burton, Snitzer-steroid course must have been completed for 4 days    -steroid course must have been completed for 7 days    Reviewed with patient:  If you are started on any steroids or antibiotics between now and your appointment, you must contact us because it may affect our ability to perform your procedure.  Yes    Is patient actively being treated for cancer or immunocompromised? No  If YES, do NOT schedule and route to RN pool     Are you able to get on and off an exam table with minimal or no assistance? Yes  If NO, do NOT schedule and route to RN pool    Are you able to roll over and lay on your stomach with minimal or no assistance? Yes  If NO, do NOT schedule and route to RN pool     Any allergies to contrast dye, iodine, shellfish, or numbing and steroid medications? No  If YES, route to RN pool AND add allergy information to appointment notes    Allergies: Hydrocodone and Oxycodone      Has the patient had a flu shot or any other vaccinations within 7 days before or after the procedure.  No     Does patient have an MRI/CT?  YES: MRI  (SI joint, hip injections, lumbar sympathetic blocks, and stellate ganglion blocks do not require an MRI)    Was the MRI done w/in the last 3 years?  Yes    Was MRI done at [facility]? Yes      If not, where was it done? N/A       If MRI was not done at [facility], [facility] or [facility] do NOT schedule and route to nursing.  If pt has an imaging disc, the injection may be scheduled but pt has to bring disc to appt. If they show up w/out disc the injection cannot be done    Reminders (please tell patient if applicable):       Instructed pt to arrive 30 minutes early for IV start if this is for a cervical procedure, ALL  sympathetic (stellate ganglion, hypogastric, or lumbar sympathetic block) and all sedation procedures (RFA, spinal cord stimulation trials).  Not Applicable   -IVs are not routinely placed for Dr. Haider cervical cases   -Dr. Jose: IVs for cervical ESIs and cervical TBDs (not CMBBs/facet inj)      If NPO for sedation, informed patient that it is okay to take medications with sips of water (except if they are to hold blood thinners).  Not Applicable   *DO take blood pressure medication if it is prescribed*      If this is for a cervical ROBERT, informed patient that aspirin needs to be held for 6 days.   Not Applicable      For all patients not having spinal cord stimulator (SCS) trials or radiofrequency ablations (RFAs), informed patient:    IV sedation is not provided for this procedure.  If you feel that an oral anti-anxiety medication is needed, you can discuss this further with your referring provider or primary care provider.  The Pain Clinic provider will discuss specifics of what the procedure includes at your appointment.  Most procedures last 10-20 minutes.  We use numbing medications to help with any discomfort during the procedure.  Not Applicable      Do not schedule procedures requiring IV placement in the first appointment of the day or first appointment after lunch.           For patients 85 or older we recommend having an adult stay w/ them for the remainder of the day.       Does the patient have any questions?  NO  Gisel Guardado  [facility]

## 2017-11-02 ENCOUNTER — RADIANT APPOINTMENT (OUTPATIENT)
Dept: GENERAL RADIOLOGY | Facility: CLINIC | Age: 62
End: 2017-11-02
Attending: PHYSICAL MEDICINE & REHABILITATION

## 2017-11-02 ENCOUNTER — RADIOLOGY INJECTION OFFICE VISIT (OUTPATIENT)
Dept: PALLIATIVE MEDICINE | Facility: CLINIC | Age: 62
End: 2017-11-02
Payer: COMMERCIAL

## 2017-11-02 VITALS — DIASTOLIC BLOOD PRESSURE: 84 MMHG | SYSTOLIC BLOOD PRESSURE: 134 MMHG | OXYGEN SATURATION: 96 % | HEART RATE: 73 BPM

## 2017-11-02 DIAGNOSIS — M51.369 LUMBAR DEGENERATIVE DISC DISEASE: ICD-10-CM

## 2017-11-02 DIAGNOSIS — M48.062 SPINAL STENOSIS OF LUMBAR REGION WITH NEUROGENIC CLAUDICATION: Primary | ICD-10-CM

## 2017-11-02 DIAGNOSIS — M48.062 SPINAL STENOSIS OF LUMBAR REGION WITH NEUROGENIC CLAUDICATION: ICD-10-CM

## 2017-11-02 PROCEDURE — 62323 NJX INTERLAMINAR LMBR/SAC: CPT | Performed by: PHYSICAL MEDICINE & REHABILITATION

## 2017-11-02 NOTE — MR AVS SNAPSHOT
After Visit Summary   11/2/2017    Edgardo Dominguez    MRN: [id number]           Patient Information     Date Of Birth          1955        Visit Information        Provider Department      11/2/2017 9:15 AM Aung Randle DO [facility]        Today's Diagnoses     Spinal stenosis of lumbar region with neurogenic claudication    -  1      Care Instructions    [facility]   Discharge Instructions  Provider: Dr. Aung Randle  Phone: [phone], option #2    Your Procedure: Caudal epidural steroid injection    Meds used:  Lidocaine (anesthetic) DepoMedrol (steroid), normal saline   Omnipaque (contrast dye)         Be cautious with walking as numbness and/or weakness in the legs may occur up to 6 - 8 hours after the procedure due to the effect of the local anesthetic used.     No driving for 6 hours, the numbing medication can affect your reflexes.     You may shower, however no swimming, tub baths, or hot tubs for 24 hours following your procedure    Avoid strenuous activity for the first 24 hours. You may resume your regular activities after that.     With diabetes, check your blood sugar more frequently than usual as your blood sugar may be higher than normal for 10-14 days following steroid injection. Contact your doctor who manages your diabetes if you have concerns about your blood sugar level    Mild to moderate increase in pain for one to several days following the injection is not uncommon    You may use ice packs 10-15 minutes three to four times a day at the injection site for comfort    You may use anti-inflammatory medications (such as Ibuprofen or Aleve or Advil) or Tylenol for pain control if necessary    Steroid injections may take several days to start working and you may see more effect from the procedure after 10-14 days      If you experience any of the following,  Call [facility] during work hours at  [phone], option #2 or on call physician after hours at [phone], option #2:  -Fever over 100 degree F  -Swelling, bleeding, redness, drainage, warmth at the injection site  -Progressive weakness or numbness of your legs   -Loss of bowel or bladder function  -Unusual headache that is not relieved by Tylenol  -Unusual new onset of pain that is not improving            Follow-ups after your visit        Follow-up notes from your care team     Return in about 2 weeks (around 11/16/2017).      Who to contact     If you have questions or need follow up information about today's clinic visit or your schedule please contact [facility] directly at [phone].  Normal or non-critical lab and imaging results will be communicated to you by Opsonahart, letter or phone within 4 business days after the clinic has received the results. If you do not hear from us within 7 days, please contact the clinic through NewsiTt or phone. If you have a critical or abnormal lab result, we will notify you by phone as soon as possible.  Submit refill requests through fflap or call your pharmacy and they will forward the refill request to us. Please allow 3 business days for your refill to be completed.          Additional Information About Your Visit        fflap Information     fflap gives you secure access to your electronic health record. If you see a primary care provider, you can also send messages to your care team and make appointments. If you have questions, please call your primary care clinic.  If you do not have a primary care provider, please call [phone] and they will assist you.        Care EveryWhere ID     This is your Care EveryWhere ID. This could be used by other organizations to access your [place] medical records  [id number]        Your Vitals Were     Pulse Pulse Oximetry                73 96%           Blood Pressure from Last 3 Encounters:   11/02/17 134/84   09/25/17 132/80    09/07/17 148/83    Weight from Last 3 Encounters:   10/20/17 80.7 kg (178 lb)   09/25/17 79.4 kg (175 lb)   09/07/17 79.4 kg (175 lb)              Today, you had the following     No orders found for display       Primary Care Provider Office Phone # Fax #    Josef Cholo Ray [phone]       [address] [facility] [address]        Equal Access to Services     TRISTAN SANTIAGO : Hadii aad ku hadasho Soomaali, waaxda luqadaha, qaybta kaalmada adeegyada, waxay idiin hayaan adeeg kharabrielle lathor . So [facility] [phone].    ATENCIÓN: Si habla español, tiene a raygoza disposición servicios gratuitos de asistencia lingüística. [facility] [phone].    We comply with applicable federal civil rights laws and [place] laws. We do not discriminate on the basis of race, color, national origin, age, disability, sex, sexual orientation, or gender identity.            Thank you!     Thank you for choosing [facility]  for your care. Our goal is always to provide you with excellent care. Hearing back from our patients is one way we can continue to improve our services. Please take a few minutes to complete the written survey that you may receive in the mail after your visit with us. Thank you!             Your Updated Medication List - Protect others around you: Learn how to safely use, store and throw away your medicines at www.disposemymeds.org.          This list is accurate as of: 11/2/17  9:32 AM.  Always use your most recent med list.                   Brand Name Dispense Instructions for use Diagnosis    acyclovir 800 MG tablet    ZOVIRAX    30 tablet    TAKE 1 TABLET BY MOUTH THREE TIMES DAILY AT START OF SYMPTOMS    Herpes simplex infection of genitourinary system       cyclobenzaprine 5 MG tablet    FLEXERIL    45 tablet    Take 1-2 tabs as needed at bedtime for pain interrupting sleep        * gabapentin 300 MG capsule    NEURONTIN    90 capsule    Take 1 capsule (300 mg) by  mouth 3 times daily    Acute bilateral low back pain with bilateral sciatica       * gabapentin 600 MG tablet    NEURONTIN    90 tablet    Take 1 tablet (600 mg) by mouth 3 times daily    Lumbar foraminal stenosis       metoprolol 25 MG 24 hr tablet    TOPROL-XL    90 tablet    Take 1 tablet (25 mg) by mouth daily    Hypertension goal BP (blood pressure) < 140/90       mometasone 0.1 % cream    ELOCON    45 g    Apply  topically daily.    Eczema       omeprazole 20 MG CR capsule    priLOSEC    180 capsule    Take 1 capsule (20 mg) by mouth 2 times daily    Gastroesophageal reflux disease without esophagitis       TYLENOL PO           * Notice:  This list has 2 medication(s) that are the same as other medications prescribed for you. Read the directions carefully, and ask your doctor or other care provider to review them with you.

## 2017-11-02 NOTE — PROGRESS NOTES
Caudal Epidural Corticosteroid Injection    Indictions:  Patient is a 62 year old male with history of low back pain with radiation secondary to lumbar spinal stenosis/lumbar degenerative disc disease, who presents for a caudal epidural corticosteroid injection.  Patient has been suffering from pain of the low back with radiation affecting most regular activities of daily living and interfering with active rehabilitation modalities.    Preoperative Diagnosis:  Lumbar spinal stenosis/lumbar degenerative disc disease    Postoperative Diagnosis:  Same    PROCEDURE PERFORMED:  Caudal epidural corticosteroid injection with fluoroscopic guidance and localization    Attending Interventionalist:  Aung Randle DO, CAQSM    Informed Consent:  Risks, benefits, and alternatives of the procedure were discussed with the patient.  Patient was given the opportunity to ask questions regarding the procedure, its indications, and associated risks.  Risks of the procedure were discussed including infection, bleeding, allergic reaction, dural puncture, headache, nerve injury, spinal cord injury, cardiovascular and CNS side effects with possible vascular entry medications.  Patient was informed both verbally and in writing.  Patient understood the informed consent and desired to have the procedure performed.    Procedure:  Patient remained awake throughout the procedure to interact and give feedback.  No pre-procedure medication was used for sedation purposes.  X-ray technician was supervised and instructed to operate the fluoroscopy machine.  A pause for the cause was completed, verifying correct patient, procedure, site, positioning, and implants or special equipment.  A standard caudal epidural injection procedure supply package was utilized.      Patient was placed in a prone position on the treatment table.  The sacrum and sacral cornua were visualized in an AP view.  Skin over the sacral hiatus was marked, prepped, and draped in a  sterile fashion.  5 cc's of 1% Lidocaine was used for local anesthetic purposes and was injected subcutaneously over the caudal epidural entry site.      Under fluoroscopy, a  22-gauge 3.5-inch needle was inserted and advanced into the sacral hiatus under fluoroscopic guidance.  After the needle passed through the sacrococcygeal ligament, the needle angle was lowered and the needle was advanced.  No evidence of paresthesias was noted throughout needle placement.  After negative aspiration for blood and cerebral spinal fluid, 1.0 cc's of Omnipaque 300 was slowly injected with 9.0 cc's of Ominpaque discarded.  Confirmation of spread of contrast within the caudal epidural spaces was made under fluoroscopic imaging on AP and lateral views.       Subsequently, 1 mL of 80 mg/ml of Depo Medrol, 4 mL's of 1% Lidocaine, and 5 mL's of normal saline was injected.  Injection was performed slowly without resistance.  Needle was withdrawn.  There was no evidence of procedural complication.    Complications:  None.      Monitoring:  Postprocedural vital signs were stable.  Patient was discharged fully alert and oriented in good and stable condition.    Total fluoroscopy time:  11 seconds    Pain Response:  At the time of discharge, the patient noted improvement in pain from 4-5/10 down to 2-3/10 post-procedure    Plan:    1. Instructed to avoid baths/swimming activities for a period of 24 hours.  2. Activity modification as discussed, including limitation of activities that cause pain/discomfort.  3. Acetaminophen/Ibuprofen/ice/heat/Flexeril as needed for improved pain control.  4. Continue with Gabapentin 900 mg 3 times/day for further treatment purposes.  5. Follow up in 2 weeks if no improvement of symptoms for further evaluation/medical care.  Consider MRI of the hamstrings without contrast, rheumatologic laboratory testing/referral, vascular testing/referral etc. as deemed appropriate moving forward. Instructed to follow-up  if change of symptoms arise.    Aung Randle DO, CAM  [facility]    Disclaimer: This note consists of symbols derived from keyboarding, dictation and/or voice recognition software. As a result, there may be errors in the script that have gone undetected. Please consider this when interpreting information found in this chart.

## 2017-11-02 NOTE — PATIENT INSTRUCTIONS
[facility]   Discharge Instructions  Provider: Dr. Aung Randle  Phone: [phone], option #2    Your Procedure: Caudal epidural steroid injection    Meds used:  Lidocaine (anesthetic) DepoMedrol (steroid), normal saline   Omnipaque (contrast dye)         Be cautious with walking as numbness and/or weakness in the legs may occur up to 6 - 8 hours after the procedure due to the effect of the local anesthetic used.     No driving for 6 hours, the numbing medication can affect your reflexes.     You may shower, however no swimming, tub baths, or hot tubs for 24 hours following your procedure    Avoid strenuous activity for the first 24 hours. You may resume your regular activities after that.     With diabetes, check your blood sugar more frequently than usual as your blood sugar may be higher than normal for 10-14 days following steroid injection. Contact your doctor who manages your diabetes if you have concerns about your blood sugar level    Mild to moderate increase in pain for one to several days following the injection is not uncommon    You may use ice packs 10-15 minutes three to four times a day at the injection site for comfort    You may use anti-inflammatory medications (such as Ibuprofen or Aleve or Advil) or Tylenol for pain control if necessary    Steroid injections may take several days to start working and you may see more effect from the procedure after 10-14 days      If you experience any of the following,  Call [facility] during work hours at [phone], option #2 or on call physician after hours at [phone], option #2:  -Fever over 100 degree F  -Swelling, bleeding, redness, drainage, warmth at the injection site  -Progressive weakness or numbness of your legs   -Loss of bowel or bladder function  -Unusual headache that is not relieved by Tylenol  -Unusual new onset of pain that is not improving

## 2017-11-02 NOTE — Clinical Note
Dear Edgardo Alanis saw me at [facility] on Nov 2, 2017.  Please refer to the visit note at your convenience and feel free to contact me should you have any questions.  Sincerely,  Aung Randle DO, [facility]

## 2017-11-03 ENCOUNTER — MYC REFILL (OUTPATIENT)
Dept: ORTHOPEDICS | Facility: CLINIC | Age: 62
End: 2017-11-03

## 2017-11-03 DIAGNOSIS — M48.062 SPINAL STENOSIS OF LUMBAR REGION WITH NEUROGENIC CLAUDICATION: Primary | ICD-10-CM

## 2017-11-03 RX ORDER — GABAPENTIN 300 MG/1
CAPSULE ORAL
Qty: 270 CAPSULE | Refills: 1 | Status: SHIPPED | OUTPATIENT
Start: 2017-11-03 | End: 2018-04-10

## 2017-11-03 RX ORDER — GABAPENTIN 600 MG/1
600 TABLET ORAL 3 TIMES DAILY
Qty: 90 TABLET | Refills: 1 | Status: CANCELLED | OUTPATIENT
Start: 2017-11-03

## 2017-11-03 NOTE — TELEPHONE ENCOUNTER
Message from [employer]:  James Cedeño Fri Nov 3, 2017 2:19 PM        ----- Message -----   From: Edgardo Dominguez   Sent: 11/3/2017 2:12 PM   [facility]  Subject: Medication Renewal Request     Original authorizing provider: DO Jairo Humphrey would like a refill of the following medications:  gabapentin (NEURONTIN) 600 MG tablet [Aung Randle DO]    Preferred pharmacy: [facility] [address] [place] [address], [place] - [facility] [address] [facility] [address]    Comment:  I only have a few days left of medication. You now have me at 2700 mg / 900 mg three times a day. The last prescription has a no refill on the bottle. I should be using the 900 mg pills and not splitting 600 mg pills Jairo Dominguez

## 2017-11-03 NOTE — TELEPHONE ENCOUNTER
Please inform patient that new Gabapentin prescription was sent to the patient's pharmacy. Please inform that they do not make 900 mg tablets for Gabapentin, thus will need to take 3 tablets of Gabapentin 300 mg 3 times/day for further treatment purposes.    Aung Randle DO, CAM  [facility]

## 2017-11-16 ENCOUNTER — OFFICE VISIT (OUTPATIENT)
Dept: ORTHOPEDICS | Facility: CLINIC | Age: 62
End: 2017-11-16
Payer: COMMERCIAL

## 2017-11-16 VITALS
SYSTOLIC BLOOD PRESSURE: 124 MMHG | BODY MASS INDEX: 26.52 KG/M2 | DIASTOLIC BLOOD PRESSURE: 84 MMHG | HEIGHT: 68 IN | WEIGHT: 175 LBS

## 2017-11-16 DIAGNOSIS — M79.651 PAIN IN BOTH THIGHS: Primary | ICD-10-CM

## 2017-11-16 DIAGNOSIS — M79.652 PAIN IN BOTH THIGHS: Primary | ICD-10-CM

## 2017-11-16 DIAGNOSIS — M51.369 LUMBAR DEGENERATIVE DISC DISEASE: ICD-10-CM

## 2017-11-16 DIAGNOSIS — M48.062 SPINAL STENOSIS OF LUMBAR REGION WITH NEUROGENIC CLAUDICATION: ICD-10-CM

## 2017-11-16 PROCEDURE — 99214 OFFICE O/P EST MOD 30 MIN: CPT | Performed by: PHYSICAL MEDICINE & REHABILITATION

## 2017-11-16 NOTE — PATIENT INSTRUCTIONS
We addressed the following today:    1. Bilateral thigh pain  2. Lumbar spinal stenosis  3. Lumbar arthritis    Activity modification as discussed  Home exercise program as instructed  Topical Treatments: Ice or heat  Over the counter medication: Acetaminophen (Tylenol) 1000 mg every 6 hours with food (Maximum of 3000 mg/day)  Ibuprofen (Advil) maximum of 800 mg four times a day with food  Prescription Medication as directed: Decreased Gabapentin to 600 mg 3 times/day for 3 days. If no return of symptoms, decreased to 300 mg 3 times/day for 3 days. If no return of symptoms, discontinue use of medication for treatment purposes.  MRI of the hamstrings at [facility] - call [phone] to schedule  Call 1-2 business days after completion of further diagnostic imaging/testing for discussion of results/further medical care (sooner if needed; call direct clinic number [[phone]] at any time with questions or concerns)

## 2017-11-16 NOTE — NURSING NOTE
"Chief Complaint   Patient presents with     RECHECK       Initial /84  Ht 5' 8\" (1.727 m)  Wt 175 lb (79.4 kg)  BMI 26.61 kg/m2 Estimated body mass index is 26.61 kg/(m^2) as calculated from the following:    Height as of this encounter: 5' 8\" (1.727 m).    Weight as of this encounter: 175 lb (79.4 kg).  Medication Reconciliation: complete     Demarcus Rojas ATC    "

## 2017-11-16 NOTE — MR AVS SNAPSHOT
After Visit Summary   11/16/2017    Edgardo Dominguez    MRN: [id number]           Patient Information     Date Of Birth          1955        Visit Information        Provider Department      11/16/2017 10:00 AM Aung Randle DO [facility]        Today's Diagnoses     Pain in both thighs    -  1    Spinal stenosis of lumbar region with neurogenic claudication        Lumbar degenerative disc disease          Care Instructions    We addressed the following today:    1. Bilateral thigh pain  2. Lumbar spinal stenosis  3. Lumbar arthritis    Activity modification as discussed  Home exercise program as instructed  Topical Treatments: Ice or heat  Over the counter medication: Acetaminophen (Tylenol) 1000 mg every 6 hours with food (Maximum of 3000 mg/day)  Ibuprofen (Advil) maximum of 800 mg four times a day with food  Prescription Medication as directed: Decreased Gabapentin to 600 mg 3 times/day for 3 days. If no return of symptoms, decreased to 300 mg 3 times/day for 3 days. If no return of symptoms, discontinue use of medication for treatment purposes.  MRI of the hamstrings at [facility] - call [phone] to schedule  Call 1-2 business days after completion of further diagnostic imaging/testing for discussion of results/further medical care (sooner if needed; call direct clinic number [[phone]] at any time with questions or concerns)              Follow-ups after your visit        Future tests that were ordered for you today     Open Future Orders        Priority Expected Expires Ordered    MR Pelvis w/o Contrast Routine  11/16/2018 11/16/2017            Who to contact     If you have questions or need follow up information about today's clinic visit or your schedule please contact [facility] directly at [phone].  Normal or non-critical lab and imaging results will be communicated to you by MyChart, letter or phone within 4 business days after the  "clinic has received the results. If you do not hear from us within 7 days, please contact the clinic through Venuemob or phone. If you have a critical or abnormal lab result, we will notify you by phone as soon as possible.  Submit refill requests through Venuemob or call your pharmacy and they will forward the refill request to us. Please allow 3 business days for your refill to be completed.          Additional Information About Your Visit        Shirley Mae'sharGaikai Information     Venuemob gives you secure access to your electronic health record. If you see a primary care provider, you can also send messages to your care team and make appointments. If you have questions, please call your primary care clinic.  If you do not have a primary care provider, please call [phone] and they will assist you.        Care EveryWhere ID     This is your Care EveryWhere ID. This could be used by other organizations to access your [place] medical records  [id number]        Your Vitals Were     Height BMI (Body Mass Index)                1.727 m (5' 8\") 26.61 kg/m2           Blood Pressure from Last 3 Encounters:   11/16/17 124/84   11/02/17 134/84   09/25/17 132/80    Weight from Last 3 Encounters:   11/16/17 79.4 kg (175 lb)   10/20/17 80.7 kg (178 lb)   09/25/17 79.4 kg (175 lb)               Primary Care Provider Office Phone # Fax #    Josef Cholo Ray [phone] [address] [facility] [address]        Equal Access to Services     TRISTAN SANTIAGO AH: Hadii aad ku hadasho Soomaali, waaxda luqadaha, qaybta kaalmada adeegyada, waxay sourav tran . So [facility] [phone].    ATENCIÓN: Si habla español, tiene a raygoza disposición servicios gratuitos de asistencia lingüística. Llame al [phone].    We comply with applicable federal civil rights laws and [place] laws. We do not discriminate on the basis of race, color, national origin, age, disability, sex, sexual orientation, or " gender identity.            Thank you!     Thank you for choosing [facility]  for your care. Our goal is always to provide you with excellent care. Hearing back from our patients is one way we can continue to improve our services. Please take a few minutes to complete the written survey that you may receive in the mail after your visit with us. Thank you!             Your Updated Medication List - Protect others around you: Learn how to safely use, store and throw away your medicines at www.disposemymeds.org.          This list is accurate as of: 11/16/17 10:42 AM.  Always use your most recent med list.                   Brand Name Dispense Instructions for use Diagnosis    acyclovir 800 MG tablet    ZOVIRAX    30 tablet    TAKE 1 TABLET BY MOUTH THREE TIMES DAILY AT START OF SYMPTOMS    Herpes simplex infection of genitourinary system       cyclobenzaprine 5 MG tablet    FLEXERIL    45 tablet    Take 1-2 tabs as needed at bedtime for pain interrupting sleep        * gabapentin 300 MG capsule    NEURONTIN    90 capsule    Take 1 capsule (300 mg) by mouth 3 times daily    Acute bilateral low back pain with bilateral sciatica       * gabapentin 600 MG tablet    NEURONTIN    90 tablet    Take 1 tablet (600 mg) by mouth 3 times daily    Lumbar foraminal stenosis       * gabapentin 300 MG capsule    NEURONTIN    270 capsule    Take 3 capsules 3 times/daily    Spinal stenosis of lumbar region with neurogenic claudication       metoprolol 25 MG 24 hr tablet    TOPROL-XL    90 tablet    Take 1 tablet (25 mg) by mouth daily    Hypertension goal BP (blood pressure) < 140/90       mometasone 0.1 % cream    ELOCON    45 g    Apply  topically daily.    Eczema       omeprazole 20 MG CR capsule    priLOSEC    180 capsule    Take 1 capsule (20 mg) by mouth 2 times daily    Gastroesophageal reflux disease without esophagitis       TYLENOL PO           * Notice:  This list has 3 medication(s) that are the same as  other medications prescribed for you. Read the directions carefully, and ask your doctor or other care provider to review them with you.

## 2017-11-16 NOTE — PROGRESS NOTES
" [facility]   Follow-up Visit s Nov 16, 2017    Subjective:  Edgardo Dominguez is a 62 year old male who is seen in follow up for evaluation of minimal low back pain with bilateral posterior thigh pain stopping at the knees.  Last visit was on 10/20/2017 with a caudal epidural corticosteroid injection completed at that time with minimal improvement of symptoms. Has continued with use of Gabapentin with minimal improvement of pain. Has been completing his home exercises as previously prescribed during formal physical therapy.     Notes minimal midline low back pain with bilateral posterior thigh pain stopping at the knees. Symptoms are worse with sitting activities and after exercise activities.  No change in symptoms with bending forward/backwards.  Denies any numbness/tingling/weakness of the lower extremities. Denies any bowel/bladder incontinence. Denies any saddle anesthesia. Denies any trauma/falls since last clinical visit.    Patient's past medical, surgical, social, and family histories are reviewed today    Objective:  /84  Ht 5' 8\" (1.727 m)  Wt 175 lb (79.4 kg)  BMI 26.61 kg/m2  General: healthy, alert, and in no distress  Skin: no suspicious lesions or rashes  Neuro: sensory exam as noted below    MSK:    THORACIC/LUMBAR SPINE  Strength:    5/5 - quadriceps, hamstrings, tibialis anterior, gastrocsoleus, and extensor hallicus longus  Special Tests:    None    Imaging:  No x-rays indicated during today's visit  Previous report was reviewed today and results were discussed with the patient    ASSESSMENT:  1. Bilateral posterior thigh pain  2. Lumbar spinal stenosis  3. Lumbar degenerative disc disease    PLAN:  1. MRI of the hamstrings without contrast at [facility] for further evaluation of current medical state.  2. Decreased Gabapentin to 600 mg 3 times/day for further treatment purposes. If no return of symptoms, decrease Gabapentin to 300 mg 3 times/day for 3 days for further " treatment purposes. If no return of symptoms, discontinue use of Gabapentin for treatment purposes.  3. Continue with pain-free home exercise program as previously prescribed during formal physical therapy.  4. Activity modification as discussed, including limitation of activities that cause pain/discomfort.  5. Acetaminophen/ice/Ibuprofen as needed for improved pain control.  6. Call 1-2 business days after completion of further diagnostic imaging/testing for discussion of results/further medical care.  Consider spine surgery referral, vascular referral/testing, etc. as deemed appropriate moving forward.  Instructed to follow-up if change of symptoms arise.    Patient's conditions were thoroughly discussed during today's visit with greater than 50% of the visit spent counseling the patient with total time spent face-to-face with the patient being 15 minutes.    Aung Randle DO, [facility]  [facility]    Disclaimer: This note consists of symbols derived from keyboarding, dictation and/or voice recognition software. As a result, there may be errors in the script that have gone undetected. Please consider this when interpreting information found in this chart.

## 2017-11-16 NOTE — LETTER
"    11/16/2017         RE: Edgardo Dominguez  [address] [address]        Dear Colleague,    Thank you for referring your patient, Edgardo Dominguez, to the [facility]. Please see a copy of my visit note below.     [facility]   Follow-up Visit s Nov 16, 2017    Subjective:  Edgardo Dominguez is a 62 year old male who is seen in follow up for evaluation of minimal low back pain with bilateral posterior thigh pain stopping at the knees.  Last visit was on 10/20/2017 with a caudal epidural corticosteroid injection completed at that time with minimal improvement of symptoms. Has continued with use of Gabapentin with minimal improvement of pain. Has been completing his home exercises as previously prescribed during formal physical therapy.     Notes minimal midline low back pain with bilateral posterior thigh pain stopping at the knees. Symptoms are worse with sitting activities and after exercise activities.  No change in symptoms with bending forward/backwards.  Denies any numbness/tingling/weakness of the lower extremities. Denies any bowel/bladder incontinence. Denies any saddle anesthesia. Denies any trauma/falls since last clinical visit.    Patient's past medical, surgical, social, and family histories are reviewed today    Objective:  /84  Ht 5' 8\" (1.727 m)  Wt 175 lb (79.4 kg)  BMI 26.61 kg/m2  General: healthy, alert, and in no distress  Skin: no suspicious lesions or rashes  Neuro: sensory exam as noted below    MSK:    THORACIC/LUMBAR SPINE  Strength:    5/5 - quadriceps, hamstrings, tibialis anterior, gastrocsoleus, and extensor hallicus longus  Special Tests:    None    Imaging:  No x-rays indicated during today's visit  Previous report was reviewed today and results were discussed with the patient    ASSESSMENT:  1. Bilateral posterior thigh pain  2. Lumbar spinal stenosis  3. Lumbar degenerative disc disease    PLAN:  1. MRI of the " hamstrings without contrast at [facility] for further evaluation of current medical state.  2. Decreased Gabapentin to 600 mg 3 times/day for further treatment purposes. If no return of symptoms, decrease Gabapentin to 300 mg 3 times/day for 3 days for further treatment purposes. If no return of symptoms, discontinue use of Gabapentin for treatment purposes.  3. Continue with pain-free home exercise program as previously prescribed during formal physical therapy.  4. Activity modification as discussed, including limitation of activities that cause pain/discomfort.  5. Acetaminophen/ice/Ibuprofen as needed for improved pain control.  6. Call 1-2 business days after completion of further diagnostic imaging/testing for discussion of results/further medical care.  Consider spine surgery referral, vascular referral/testing, etc. as deemed appropriate moving forward.  Instructed to follow-up if change of symptoms arise.    Patient's conditions were thoroughly discussed during today's visit with greater than 50% of the visit spent counseling the patient with total time spent face-to-face with the patient being 15 minutes.    Aung Randle DO, FLORECITA  [facility]    Disclaimer: This note consists of symbols derived from keyboarding, dictation and/or voice recognition software. As a result, there may be errors in the script that have gone undetected. Please consider this when interpreting information found in this chart.        Again, thank you for allowing me to participate in the care of your patient.        Sincerely,        Aung Randle DO

## 2017-11-20 ENCOUNTER — TRANSFERRED RECORDS (OUTPATIENT)
Dept: HEALTH INFORMATION MANAGEMENT | Facility: CLINIC | Age: 62
End: 2017-11-20

## 2017-11-22 ENCOUNTER — TELEPHONE (OUTPATIENT)
Dept: ORTHOPEDICS | Facility: CLINIC | Age: 62
End: 2017-11-22

## 2017-11-22 NOTE — TELEPHONE ENCOUNTER
Please call patient to inform that MRI of the hamstrings from [facility] was noted to be unremarkable.  Please call patient to inform that would recommend referral to spine surgery for consideration of surgical intervention (may or not not recommend based on symptoms/previous testing) (Dr. Nj) for further treatment purposes with consideration of vascular/rheumatology referral, laboratory testing, etc as deemed appropriate moving froward.    Aung Randle DO, CAQSM  [facility]

## 2017-11-24 NOTE — TELEPHONE ENCOUNTER
Gave results to patient over the phone.     Patient does not want to meet with Dr. Nj until after her gets back from [place] in late March.

## 2018-02-05 ENCOUNTER — CARE COORDINATION (OUTPATIENT)
Dept: PHYSICAL MEDICINE AND REHAB | Facility: CLINIC | Age: 63
End: 2018-02-05

## 2018-03-13 DIAGNOSIS — A60.00 HERPES SIMPLEX INFECTION OF GENITOURINARY SYSTEM: ICD-10-CM

## 2018-03-13 RX ORDER — ACYCLOVIR 800 MG/1
TABLET ORAL
Qty: 30 TABLET | Refills: 1 | Status: SHIPPED | OUTPATIENT
Start: 2018-03-13 | End: 2018-11-29

## 2018-03-13 NOTE — TELEPHONE ENCOUNTER
"Prescription approved per [facility] Refill Protocol.  Amarilys Mackenzie RN    Requested Prescriptions   Pending Prescriptions Disp Refills     acyclovir (ZOVIRAX) 800 MG tablet [Pharmacy Med Name: ACYCLOVIR 800MG TABLETS] 30 tablet 1     Sig: TAKE 1 TABLET BY MOUTH THREE TIMES DAILY AT START OF SYMPTOMS    Antivirals for Herpes Protocol Passed    3/13/2018  8:19 AM       Passed - Patient is age 12 or older       Passed - Recent (12 mo) or future (30 days) visit within the authorizing provider's specialty    Patient had office visit in the last 12 months or has a visit in the next 30 days with authorizing provider or within the authorizing provider's specialty.  See \"Patient Info\" tab in inbasket, or \"Choose Columns\" in Meds & Orders section of the refill encounter.           Passed - Normal serum creatinine on file in past 12 months    Recent Labs   Lab Test  05/01/17   0850   CR  0.92               "

## 2018-03-19 ENCOUNTER — OFFICE VISIT (OUTPATIENT)
Dept: NEUROSURGERY | Facility: CLINIC | Age: 63
End: 2018-03-19
Attending: PHYSICIAN ASSISTANT
Payer: COMMERCIAL

## 2018-03-19 VITALS
TEMPERATURE: 97 F | HEART RATE: 60 BPM | OXYGEN SATURATION: 99 % | DIASTOLIC BLOOD PRESSURE: 87 MMHG | SYSTOLIC BLOOD PRESSURE: 144 MMHG

## 2018-03-19 DIAGNOSIS — M53.3 SI (SACROILIAC) JOINT DYSFUNCTION: Primary | ICD-10-CM

## 2018-03-19 PROCEDURE — G0463 HOSPITAL OUTPT CLINIC VISIT: HCPCS | Performed by: PHYSICIAN ASSISTANT

## 2018-03-19 PROCEDURE — 99203 OFFICE O/P NEW LOW 30 MIN: CPT | Performed by: PHYSICIAN ASSISTANT

## 2018-03-19 NOTE — NURSING NOTE
"Edgardo Dominguez is a 62 year old male who presents for:  Chief Complaint   Patient presents with     Neurologic Problem     referral from Dr. JOSELIN Randle for lumbar stenosis        Initial Vitals:  There were no vitals taken for this visit. Estimated body mass index is 26.61 kg/(m^2) as calculated from the following:    Height as of 11/16/17: 5' 8\" (1.727 m).    Weight as of 11/16/17: 175 lb (79.4 kg).. There is no height or weight on file to calculate BSA. BP completed using cuff size: regular  Data Unavailable    Do you feel safe in your environment?  Yes  Do you need any refills today? No    Nursing Comments: referral from Dr. JOSELIN Randle for lumbar stenosis.  Patient rates his pain today as 3          Debby Moore CMA, AAS      Discharge plan:   See providers dictation   Debby Moore CMA, AAS       "

## 2018-03-19 NOTE — MR AVS SNAPSHOT
After Visit Summary   3/19/2018    Edgardo Dominguez    MRN: [id number]           Patient Information     Date Of Birth          1955        Visit Information        Provider Department      3/19/2018 11:10 AM Juliana Martinez PA-C [facility]        Today's Diagnoses     SI (sacroiliac) joint dysfunction    -  1      Care Instructions    1. Referral placed to Dr. Kevin Díaz. Please contact [phone] to schedule.          Follow-ups after your visit        Additional Services     OCCUPATIONAL MEDICINE REFERRAL       Dr. Kevin Díaz (Occupational Medicine)    Fax: [phone]                  Who to contact     If you have questions or need follow up information about today's clinic visit or your schedule please contact [facility] NEUROSURGERY [facility] directly at [phone].  Normal or non-critical lab and imaging results will be communicated to you by [employer], letter or phone within 4 business days after the clinic has received the results. If you do not hear from us within 7 days, please contact the clinic through [employer] or phone. If you have a critical or abnormal lab result, we will notify you by phone as soon as possible.  Submit refill requests through [employer] or call your pharmacy and they will forward the refill request to us. Please allow 3 business days for your refill to be completed.          Additional Information About Your Visit        MyChart Information     [employer] gives you secure access to your electronic health record. If you see a primary care provider, you can also send messages to your care team and make appointments. If you have questions, please call your primary care clinic.  If you do not have a primary care provider, please call [phone] and they will assist you.        Care EveryWhere ID     This is your Care EveryWhere ID. This could be used by other organizations to access your [facility] medical records  [id number]         Your Vitals Were     Pulse Temperature Pulse Oximetry             60 97  F (36.1  C) (Oral) 99%          Blood Pressure from Last 3 Encounters:   03/19/18 144/87   11/16/17 124/84   11/02/17 134/84    Weight from Last 3 Encounters:   11/16/17 175 lb (79.4 kg)   10/20/17 178 lb (80.7 kg)   09/25/17 175 lb (79.4 kg)              We Performed the Following     OCCUPATIONAL MEDICINE REFERRAL        Primary Care Provider Office Phone # Fax #    Josef Cholo Ray [phone] [address] [address]        Equal Access to Services     [facility] PAMELA : Hadii son Andrade, waaxda nereyda, qaybta kaalmada meily, mahsa tran . So [facility] [phone].    ATENCIÓN: Si habla español, tiene a raygoza disposición servicios gratuitos de asistencia lingüística. Llame al [phone].    We comply with applicable federal civil rights laws and [place] laws. We do not discriminate on the basis of race, color, national origin, age, disability, sex, sexual orientation, or gender identity.            Thank you!     Thank you for choosing [facility] NEUROSURGERY [facility]  for your care. Our goal is always to provide you with excellent care. Hearing back from our patients is one way we can continue to improve our services. Please take a few minutes to complete the written survey that you may receive in the mail after your visit with us. Thank you!             Your Updated Medication List - Protect others around you: Learn how to safely use, store and throw away your medicines at www.disposemymeds.org.          This list is accurate as of 3/19/18 11:49 AM.  Always use your most recent med list.                   Brand Name Dispense Instructions for use Diagnosis    acyclovir 800 MG tablet    ZOVIRAX    30 tablet    TAKE 1 TABLET BY MOUTH THREE TIMES DAILY AT START OF SYMPTOMS    Herpes simplex infection of genitourinary system       ATENOLOL PO       Take 25 mg by mouth daily        cyclobenzaprine 5 MG tablet    FLEXERIL    45 tablet    Take 1-2 tabs as needed at bedtime for pain interrupting sleep        * gabapentin 300 MG capsule    NEURONTIN    90 capsule    Take 1 capsule (300 mg) by mouth 3 times daily    Acute bilateral low back pain with bilateral sciatica       * gabapentin 600 MG tablet    NEURONTIN    90 tablet    Take 1 tablet (600 mg) by mouth 3 times daily    Lumbar foraminal stenosis       * gabapentin 300 MG capsule    NEURONTIN    270 capsule    Take 3 capsules 3 times/daily    Spinal stenosis of lumbar region with neurogenic claudication       metoprolol succinate 25 MG 24 hr tablet    TOPROL-XL    90 tablet    Take 1 tablet (25 mg) by mouth daily    Hypertension goal BP (blood pressure) < 140/90       mometasone 0.1 % cream    ELOCON    45 g    Apply  topically daily.    Eczema       omeprazole 20 MG CR capsule    priLOSEC    180 capsule    Take 1 capsule (20 mg) by mouth 2 times daily    Gastroesophageal reflux disease without esophagitis       TYLENOL PO           * Notice:  This list has 3 medication(s) that are the same as other medications prescribed for you. Read the directions carefully, and ask your doctor or other care provider to review them with you.

## 2018-03-19 NOTE — LETTER
3/19/2018         RE: Edgardo Dominguez  [address] [address]        Dear Colleague,    Thank you for referring your patient, Edgardo Dominguez, to the [facility] NEUROSURGERY CLINIC. Please see a copy of my visit note below.    Dr. Ector Gupta  [facility]  Neurosurgery Clinic Visit      CC: Low back and bilateral leg pain    Primary care Provider: Josef Ray      Reason For Visit:   I was asked by Aung Randle DO to consult on the patient for spinal stenosis of lumbar region with neurogenic claudication.      HPI: Edgardo Dominguez is a 62 year old male who presents for evaluation of chronic low back and bilateral leg pain x 1 year. Pain is located in bilateral low back and radiates down posterior legs into the thighs. Describes the pain as constant and achy. Pain is worsened with chiropractic treatment or sitting. Pain is alleviated with walking. He has had 2 ESIs, most recent LESI Oct 2017 with minimal improvement. He did complete PT as well and has continued to perform home exercises. EMG from Oct 2017 of BLE negative. Bilateral hamstring MRIs negative. Denies bladder/bowel incontinence or balance issues.    Current pain: 3/10 At worst: 5-6/10    Past Medical History:   Diagnosis Date     Esophageal reflux      Hypertension      Other genital herpes      Unspecified disorder of lipoid metabolism      Varicella without mention of complication        Past Medical History reviewed with patient during visit.    Past Surgical History:   Procedure Laterality Date     ARTHRODESIS FOOT Right 3/11/2015    Procedure: ARTHRODESIS FOOT;  Surgeon: Gonzalo Watkins DPM;  Location:  [facility]     C AFF LARYNX SURG PROC UNLISTED  1963    vocal nodules removed     C APPENDECTOMY  1966     C OPEN RX ANKLE DISLOCATN+FIXATN  1978    (L) ORIF ankle     COLONOSCOPY N/A 12/18/2015    Procedure: COLONOSCOPY;  Surgeon: Ruslan Narayan MD;  Location:  GI     HC REMOVAL OF  TONSILS,12+ Y/O  1984     Past Surgical History reviewed with patient during visit.    Current Outpatient Prescriptions   Medication     acyclovir (ZOVIRAX) 800 MG tablet     gabapentin (NEURONTIN) 300 MG capsule     cyclobenzaprine (FLEXERIL) 5 MG tablet     gabapentin (NEURONTIN) 600 MG tablet     metoprolol (TOPROL-XL) 25 MG 24 hr tablet     omeprazole (PRILOSEC) 20 MG CR capsule     gabapentin (NEURONTIN) 300 MG capsule     Acetaminophen (TYLENOL PO)     mometasone (ELOCON) 0.1 % cream     No current facility-administered medications for this visit.        Allergies   Allergen Reactions     Hydrocodone Nausea     Oxycodone Nausea       Social History     Social History     Marital status: Single     Spouse name: Ranulfo Pro     Number of children: 0     Years of education: 20     Occupational History      [employer]     Social History Main Topics     Smoking status: Never Smoker     Smokeless tobacco: Never Used     Alcohol use 0.0 oz/week     0 Standard drinks or equivalent per week      Comment: 3 a week      Drug use: No     Sexual activity: Yes     Partners: Male     Other Topics Concern      Service No     Blood Transfusions No     Caffeine Concern No     Occupational Exposure Yes     Refugees at work-concerned with TB     Hobby Hazards No     Sleep Concern No     Stress Concern No     Weight Concern No     Special Diet No     Back Care Yes     bad back     Exercise Yes     Bike Helmet Yes     Seat Belt Yes     Self-Exams No     Parent/Sibling W/ Cabg, Mi Or Angioplasty Before 65f 55m? No     Social History Narrative    Social Documentation:7/10        Balanced Diet: YES    Calcium intake: 1200mg per day (supplements),vit    Caffeine: 2-3 per day - soda    Exercise:  type of activity gym;  4-5  times per week    Sunscreen: Yes    Seatbelts:  Yes    Self Testicular Exam: yes    Physical/Emotional/Sexual Abuse: No     Do you feel safe in your environment? Yes        Cholesterol  screen up to date: today    Eye Exam up to date: Yes    Dental Exam up to date: Yes    Dexa Scan up to date: Does Not Apply    Colonoscopy up to date: Yes 11/23/2005    Immunizations up to date: Yes    Glucose screen if over 40:  No -         Edgar Soto ma                   Family History   Problem Relation Age of Onset     Hypertension Mother      Arthritis Mother      C.A.D. Mother      Lipids Mother      Breast Cancer Mother      mastectomy 2012     C.A.D. Father      GASTROINTESTINAL DISEASE Father      Alzheimer Disease Father      Lipids Father      HEART DISEASE Maternal Grandmother      Hypertension Maternal Grandmother      CEREBROVASCULAR DISEASE Maternal Grandmother      HEART DISEASE Maternal Grandfather      CEREBROVASCULAR DISEASE Maternal Grandfather      CEREBROVASCULAR DISEASE Paternal Grandmother      Alzheimer Disease Paternal Grandmother      HEART DISEASE Paternal Grandfather      CEREBROVASCULAR DISEASE Paternal Grandfather      Alcohol/Drug Sister      Depression Sister      Musculoskeletal Disorder Brother      Lipids Brother           ROS: 10 point ROS neg other than the symptoms noted above in the HPI.    Vital Signs: There were no vitals taken for this visit.    Examination:  Constitutional:  Alert, well nourished, NAD.  HEENT: Normocephalic, atraumatic.   Pulmonary:  Without shortness of breath, normal effort.   Lymph: no lymphadenopathy to low back or LE.   Integumentary: Skin is free of rashes or lesions.   Cardiovascular:  No pitting edema of BLE.      Neurological:  Awake  Alert  Oriented x 3  Speech clear  Cranial nerves II - XII grossly intact  PERRL  EOMI  Face symmetric  Tongue midline  Motor exam   Hip Flexor:                Right: 5/5  Left:  5/5  Hip Adductor:             Right:  5/5  Left:  5/5  Hip Abductor:             Right:  5/5  Left:  5/5  Gastroc Soleus:        Right:  5/5  Left:  5/5  Tib/Ant:                      Right:  5/5  Left:  5/5  EHL:                           Right:  5/5  Left:  5/5       Sensation normal to bilateral upper and lower extremities.    Reflexes are 2+ in the patellar and Achilles. There is no clonus. Downgoing Babinski.  Musculoskeletal:  Gait: Able to stand from a seated position. Normal non-antalgic, non-myelopathic gait.  Able to heel/toe walk without loss of balance  Lumbar examination reveals no tenderness of the spine or paraspinous muscles.  Hip height is symmetrical. Negative SI joint, sciatic notch or greater trochanteric tenderness to palpation bilaterally.  Straight leg raise is negative bilaterally.      Imaging:   MR LUMBAR SPINE WITHOUT CONTRAST  8/30/2017 9:10 PM     HISTORY:  Low back pain with radiation.  Evaluate for spinal stenosis, lumbar disc herniation, lumbar degenerative disc disease, etc.     TECHNIQUE: Sagittal T1 and T2, sagittal IR, and transverse proton density and T2-weighted pulse sequences.     FINDINGS: Five lumbar vertebrae are assumed. Vertebral body heights and sagittal alignment are within normal limits. The conus medullaris is unremarkable in appearance on the sagittal images. Apophyseal joints are essentially within normal limits. Degenerative loss of disc signal is noted, greatest in the mid and upper lumbar spine with Schmorl's nodes noted about the L1-l2 disc and to a lesser degree the remaining lumbar discs. No adjacent marrow edema. Disc heights are relatively well preserved.     T12-L1, L1-L2: Minimal if any annular bulge. No central or foraminal stenosis.     L2-L3: Diffuse annular bulge and lateral endplate spurring with mild bilateral foraminal stenosis. No central stenosis.     L3-L4: Annular bulge and mild to moderate bilateral foraminal  stenosis. No central stenosis.     L4-L5: Lateral annular bulging and mild bilateral foraminal stenosis. No central stenosis.     L5-S1: Minimal annular bulge. No central or foraminal stenosis.     IMPRESSION: Mild or early multilevel degenerative disc disease,  greatest in the mid and upper lumbar spine. Mild multilevel foraminal stenosis, greatest at L3-L4. No central stenosis.     GHASSAN ALVARENGA MD    Assessment/Plan:   Edgardo Dominguez is a 62 year old male who presents for evaluation of chronic low back and bilateral leg pain x 1 year. Pain is located in bilateral low back and radiates down posterior legs into the thighs. Describes the pain as constant and achy. MRI reviewed in detail with patient. No weakness or deficits on exam. He has tried LESI, chiropractic, massage, and physical therapy without relief.  He does also have negative BLE EMG and negative bilateral hamstring MRIs. Given his ongoing posterior radicular symptoms, I have referred him to Dr. Kevin Díaz for further evaluation. Patient voiced understanding and agreement.          Juliana Martinez PA-C  [facility]  [address] [address]    Tel [phone]  Pager [phone]      Again, thank you for allowing me to participate in the care of your patient.        Sincerely,        Juliana Martinez PA-C

## 2018-03-19 NOTE — PROGRESS NOTES
Dr. Ector Gupta  [facility]  Neurosurgery Clinic Visit      CC: Low back and bilateral leg pain    Primary care Provider: Josef Ray      Reason For Visit:   I was asked by Aung Randle DO to consult on the patient for spinal stenosis of lumbar region with neurogenic claudication.      HPI: Edgardo Dominguez is a 62 year old male who presents for evaluation of chronic low back and bilateral leg pain x 1 year. Pain is located in bilateral low back and radiates down posterior legs into the thighs. Describes the pain as constant and achy. Pain is worsened with chiropractic treatment or sitting. Pain is alleviated with walking. He has had 2 ESIs, most recent LESI Oct 2017 with minimal improvement. He did complete PT as well and has continued to perform home exercises. EMG from Oct 2017 of BLE negative. Bilateral hamstring MRIs negative. Denies bladder/bowel incontinence or balance issues.    Current pain: 3/10 At worst: 5-6/10    Past Medical History:   Diagnosis Date     Esophageal reflux      Hypertension      Other genital herpes      Unspecified disorder of lipoid metabolism      Varicella without mention of complication        Past Medical History reviewed with patient during visit.    Past Surgical History:   Procedure Laterality Date     ARTHRODESIS FOOT Right 3/11/2015    Procedure: ARTHRODESIS FOOT;  Surgeon: Gonzalo Watkins DPM;  Location:  SD     C AFF LARYNX SURG PROC UNLISTED  1963    vocal nodules removed     C APPENDECTOMY  1966     C OPEN RX ANKLE DISLOCATN+FIXATN  1978    (L) ORIF ankle     COLONOSCOPY N/A 12/18/2015    Procedure: COLONOSCOPY;  Surgeon: Ruslan Narayan MD;  Location:  GI     HC REMOVAL OF TONSILS,12+ Y/O  1984     Past Surgical History reviewed with patient during visit.    Current Outpatient Prescriptions   Medication     acyclovir (ZOVIRAX) 800 MG tablet     gabapentin (NEURONTIN) 300 MG capsule     cyclobenzaprine (FLEXERIL) 5 MG tablet      gabapentin (NEURONTIN) 600 MG tablet     metoprolol (TOPROL-XL) 25 MG 24 hr tablet     omeprazole (PRILOSEC) 20 MG CR capsule     gabapentin (NEURONTIN) 300 MG capsule     Acetaminophen (TYLENOL PO)     mometasone (ELOCON) 0.1 % cream     No current facility-administered medications for this visit.        Allergies   Allergen Reactions     Hydrocodone Nausea     Oxycodone Nausea       Social History     Social History     Marital status: Single     Spouse name: Ranulfo Pro     Number of children: 0     Years of education: 20     Occupational History      [employer]     Social History Main Topics     Smoking status: Never Smoker     Smokeless tobacco: Never Used     Alcohol use 0.0 oz/week     0 Standard drinks or equivalent per week      Comment: 3 a week      Drug use: No     Sexual activity: Yes     Partners: Male     Other Topics Concern      Service No     Blood Transfusions No     Caffeine Concern No     Occupational Exposure Yes     Refugees at work-concerned with TB     Hobby Hazards No     Sleep Concern No     Stress Concern No     Weight Concern No     Special Diet No     Back Care Yes     bad back     Exercise Yes     Bike Helmet Yes     Seat Belt Yes     Self-Exams No     Parent/Sibling W/ Cabg, Mi Or Angioplasty Before 65f 55m? No     Social History Narrative    Social Documentation:7/10        Balanced Diet: YES    Calcium intake: 1200mg per day (supplements),vit    Caffeine: 2-3 per day - soda    Exercise:  type of activity gym;  4-5  times per week    Sunscreen: Yes    Seatbelts:  Yes    Self Testicular Exam: yes    Physical/Emotional/Sexual Abuse: No     Do you feel safe in your environment? Yes        Cholesterol screen up to date: today    Eye Exam up to date: Yes    Dental Exam up to date: Yes    Dexa Scan up to date: Does Not Apply    Colonoscopy up to date: Yes 11/23/2005    Immunizations up to date: Yes    Glucose screen if over 40:  No -         Edgar Soto  ma                   Family History   Problem Relation Age of Onset     Hypertension Mother      Arthritis Mother      C.A.D. Mother      Lipids Mother      Breast Cancer Mother      mastectomy 2012     C.A.D. Father      GASTROINTESTINAL DISEASE Father      Alzheimer Disease Father      Lipids Father      HEART DISEASE Maternal Grandmother      Hypertension Maternal Grandmother      CEREBROVASCULAR DISEASE Maternal Grandmother      HEART DISEASE Maternal Grandfather      CEREBROVASCULAR DISEASE Maternal Grandfather      CEREBROVASCULAR DISEASE Paternal Grandmother      Alzheimer Disease Paternal Grandmother      HEART DISEASE Paternal Grandfather      CEREBROVASCULAR DISEASE Paternal Grandfather      Alcohol/Drug Sister      Depression Sister      Musculoskeletal Disorder Brother      Lipids Brother           ROS: 10 point ROS neg other than the symptoms noted above in the HPI.    Vital Signs: There were no vitals taken for this visit.    Examination:  Constitutional:  Alert, well nourished, NAD.  HEENT: Normocephalic, atraumatic.   Pulmonary:  Without shortness of breath, normal effort.   Lymph: no lymphadenopathy to low back or LE.   Integumentary: Skin is free of rashes or lesions.   Cardiovascular:  No pitting edema of BLE.      Neurological:  Awake  Alert  Oriented x 3  Speech clear  Cranial nerves II - XII grossly intact  PERRL  EOMI  Face symmetric  Tongue midline  Motor exam   Hip Flexor:                Right: 5/5  Left:  5/5  Hip Adductor:             Right:  5/5  Left:  5/5  Hip Abductor:             Right:  5/5  Left:  5/5  Gastroc Soleus:        Right:  5/5  Left:  5/5  Tib/Ant:                      Right:  5/5  Left:  5/5  EHL:                          Right:  5/5  Left:  5/5       Sensation normal to bilateral upper and lower extremities.    Reflexes are 2+ in the patellar and Achilles. There is no clonus. Downgoing Babinski.  Musculoskeletal:  Gait: Able to stand from a seated position. Normal  non-antalgic, non-myelopathic gait.  Able to heel/toe walk without loss of balance  Lumbar examination reveals no tenderness of the spine or paraspinous muscles.  Hip height is symmetrical. Negative SI joint, sciatic notch or greater trochanteric tenderness to palpation bilaterally.  Straight leg raise is negative bilaterally.      Imaging:   MR LUMBAR SPINE WITHOUT CONTRAST  8/30/2017 9:10 PM     HISTORY:  Low back pain with radiation.  Evaluate for spinal stenosis, lumbar disc herniation, lumbar degenerative disc disease, etc.     TECHNIQUE: Sagittal T1 and T2, sagittal IR, and transverse proton density and T2-weighted pulse sequences.     FINDINGS: Five lumbar vertebrae are assumed. Vertebral body heights and sagittal alignment are within normal limits. The conus medullaris is unremarkable in appearance on the sagittal images. Apophyseal joints are essentially within normal limits. Degenerative loss of disc signal is noted, greatest in the mid and upper lumbar spine with Schmorl's nodes noted about the L1-l2 disc and to a lesser degree the remaining lumbar discs. No adjacent marrow edema. Disc heights are relatively well preserved.     T12-L1, L1-L2: Minimal if any annular bulge. No central or foraminal stenosis.     L2-L3: Diffuse annular bulge and lateral endplate spurring with mild bilateral foraminal stenosis. No central stenosis.     L3-L4: Annular bulge and mild to moderate bilateral foraminal  stenosis. No central stenosis.     L4-L5: Lateral annular bulging and mild bilateral foraminal stenosis. No central stenosis.     L5-S1: Minimal annular bulge. No central or foraminal stenosis.     IMPRESSION: Mild or early multilevel degenerative disc disease, greatest in the mid and upper lumbar spine. Mild multilevel foraminal stenosis, greatest at L3-L4. No central stenosis.     GHASSAN ALVARENGA MD    Assessment/Plan:   Edgardo Dominguez is a 62 year old male who presents for evaluation of chronic low back and  bilateral leg pain x 1 year. Pain is located in bilateral low back and radiates down posterior legs into the thighs. Describes the pain as constant and achy. MRI reviewed in detail with patient. No weakness or deficits on exam. He has tried LESI, chiropractic, massage, and physical therapy without relief.  He does also have negative BLE EMG and negative bilateral hamstring MRIs. Given his ongoing posterior radicular symptoms, I have referred him to Dr. Kevin Díaz for further evaluation. Patient voiced understanding and agreement.          Juliana Martinez PA-C  [facility]  [address] [address]    Tel [phone]  Pager [phone]

## 2018-03-22 ENCOUNTER — TRANSFERRED RECORDS (OUTPATIENT)
Dept: HEALTH INFORMATION MANAGEMENT | Facility: CLINIC | Age: 63
End: 2018-03-22

## 2018-04-10 ENCOUNTER — OFFICE VISIT (OUTPATIENT)
Dept: FAMILY MEDICINE | Facility: CLINIC | Age: 63
End: 2018-04-10
Payer: COMMERCIAL

## 2018-04-10 VITALS
OXYGEN SATURATION: 99 % | BODY MASS INDEX: 27.16 KG/M2 | WEIGHT: 179.2 LBS | TEMPERATURE: 96.3 F | RESPIRATION RATE: 16 BRPM | SYSTOLIC BLOOD PRESSURE: 150 MMHG | HEART RATE: 67 BPM | HEIGHT: 68 IN | DIASTOLIC BLOOD PRESSURE: 90 MMHG

## 2018-04-10 DIAGNOSIS — M53.3 SACRAL PAIN: Primary | ICD-10-CM

## 2018-04-10 DIAGNOSIS — I10 HYPERTENSION GOAL BP (BLOOD PRESSURE) < 140/90: ICD-10-CM

## 2018-04-10 LAB
ALBUMIN SERPL-MCNC: 3.9 G/DL (ref 3.4–5)
ANION GAP SERPL CALCULATED.3IONS-SCNC: 6 MMOL/L (ref 3–14)
BASOPHILS # BLD AUTO: 0 10E9/L (ref 0–0.2)
BASOPHILS NFR BLD AUTO: 0.5 %
BUN SERPL-MCNC: 12 MG/DL (ref 7–30)
CALCIUM SERPL-MCNC: 9.2 MG/DL (ref 8.5–10.1)
CHLORIDE SERPL-SCNC: 109 MMOL/L (ref 94–109)
CO2 SERPL-SCNC: 25 MMOL/L (ref 20–32)
CREAT SERPL-MCNC: 0.93 MG/DL (ref 0.66–1.25)
CRP SERPL-MCNC: <2.9 MG/L (ref 0–8)
DIFFERENTIAL METHOD BLD: NORMAL
EOSINOPHIL # BLD AUTO: 0.2 10E9/L (ref 0–0.7)
EOSINOPHIL NFR BLD AUTO: 2.3 %
ERYTHROCYTE [DISTWIDTH] IN BLOOD BY AUTOMATED COUNT: 13 % (ref 10–15)
ERYTHROCYTE [SEDIMENTATION RATE] IN BLOOD BY WESTERGREN METHOD: 7 MM/H (ref 0–20)
GFR SERPL CREATININE-BSD FRML MDRD: 82 ML/MIN/1.7M2
GLUCOSE SERPL-MCNC: 89 MG/DL (ref 70–99)
HCT VFR BLD AUTO: 43 % (ref 40–53)
HGB BLD-MCNC: 15.1 G/DL (ref 13.3–17.7)
LYMPHOCYTES # BLD AUTO: 2.4 10E9/L (ref 0.8–5.3)
LYMPHOCYTES NFR BLD AUTO: 35.4 %
MCH RBC QN AUTO: 31.9 PG (ref 26.5–33)
MCHC RBC AUTO-ENTMCNC: 35.1 G/DL (ref 31.5–36.5)
MCV RBC AUTO: 91 FL (ref 78–100)
MONOCYTES # BLD AUTO: 0.7 10E9/L (ref 0–1.3)
MONOCYTES NFR BLD AUTO: 10.7 %
NEUTROPHILS # BLD AUTO: 3.4 10E9/L (ref 1.6–8.3)
NEUTROPHILS NFR BLD AUTO: 51.1 %
PHOSPHATE SERPL-MCNC: 3.6 MG/DL (ref 2.5–4.5)
PLATELET # BLD AUTO: 240 10E9/L (ref 150–450)
POTASSIUM SERPL-SCNC: 4.1 MMOL/L (ref 3.4–5.3)
RBC # BLD AUTO: 4.73 10E12/L (ref 4.4–5.9)
SODIUM SERPL-SCNC: 140 MMOL/L (ref 133–144)
WBC # BLD AUTO: 6.7 10E9/L (ref 4–11)

## 2018-04-10 PROCEDURE — 85652 RBC SED RATE AUTOMATED: CPT | Performed by: FAMILY MEDICINE

## 2018-04-10 PROCEDURE — 80069 RENAL FUNCTION PANEL: CPT | Performed by: FAMILY MEDICINE

## 2018-04-10 PROCEDURE — 86140 C-REACTIVE PROTEIN: CPT | Performed by: FAMILY MEDICINE

## 2018-04-10 PROCEDURE — 36415 COLL VENOUS BLD VENIPUNCTURE: CPT | Performed by: FAMILY MEDICINE

## 2018-04-10 PROCEDURE — 99215 OFFICE O/P EST HI 40 MIN: CPT | Performed by: FAMILY MEDICINE

## 2018-04-10 PROCEDURE — 85025 COMPLETE CBC W/AUTO DIFF WBC: CPT | Performed by: FAMILY MEDICINE

## 2018-04-10 RX ORDER — MELOXICAM 15 MG/1
15 TABLET ORAL DAILY
Qty: 30 TABLET | Refills: 1 | Status: SHIPPED | OUTPATIENT
Start: 2018-04-10 | End: 2019-10-28

## 2018-04-10 RX ORDER — HYDROCHLOROTHIAZIDE 25 MG/1
25 TABLET ORAL DAILY
Qty: 90 TABLET | Refills: 3 | Status: SHIPPED | OUTPATIENT
Start: 2018-04-10 | End: 2018-11-29

## 2018-04-10 NOTE — PROGRESS NOTES
SUBJECTIVE:   CC: Edgardo Dominguez is an 62 year old male who presents for preventative health visit.     Physical   Annual:     Getting at least 3 servings of Calcium per day::  NO    Bi-annual eye exam::  NO    Dental care twice a year::  Yes    Sleep apnea or symptoms of sleep apnea::  None    Diet::  Regular (no restrictions)    Frequency of exercise::  4-5 days/week    Duration of exercise::  15-30 minutes    Taking medications regularly::  Yes    Medication side effects::  Other    Additional concerns today::  YES              Initially presented for a physical but he actually is early for that and mainly wants to talk about his back    chronic low back and bilateral leg pain x 1 year somewhat resiusteant to investigations and treatment. Pain is located in bilateral low back and radiates down posterior legs into the thighs. Describes the pain as constant and achy. Pain is worsened with chiropractic treatment or sitting. Pain is alleviated with walking. He has had 2 ESIs, most recent LESI Oct 2017 with minimal improvement. He did complete PT as well and has continued to perform home exercises. EMG from Oct 2017 of BLE negative. Bilateral hamstring MRIs negative. Denies bladder/bowel incontinence or balance issues.    Then saw Pelvic specialist, Dr. Díaz and that pelvic MRI was normal    Was on Neurontin, but up to 2700mg and this did not change anything at all        Current Outpatient Prescriptions   Medication     meloxicam (MOBIC) 15 MG tablet     hydrochlorothiazide (HYDRODIURIL) 25 MG tablet     cyclobenzaprine (FLEXERIL) 5 MG tablet     omeprazole (PRILOSEC) 20 MG CR capsule     Acetaminophen (TYLENOL PO)     acyclovir (ZOVIRAX) 800 MG tablet     [DISCONTINUED] gabapentin (NEURONTIN) 300 MG capsule     [DISCONTINUED] gabapentin (NEURONTIN) 600 MG tablet     gabapentin (NEURONTIN) 300 MG capsule     mometasone (ELOCON) 0.1 % cream     No current facility-administered medications for this visit.      I have  reviewed the patient's medical history in detail; there are no changes to the history as noted in EpicCare.  Social History     Social History     Marital status: Single     Spouse name: Ranulfo Pro     Number of children: 0     Years of education: 20     Occupational History      [employer]     Social History Main Topics     Smoking status: Never Smoker     Smokeless tobacco: Never Used     Alcohol use 0.0 oz/week     0 Standard drinks or equivalent per week      Comment: 3 a week      Drug use: No     Sexual activity: Yes     Partners: Male     Other Topics Concern      Service No     Blood Transfusions No     Caffeine Concern No     Occupational Exposure Yes     Refugees at work-concerned with TB     Hobby Hazards No     Sleep Concern No     Stress Concern No     Weight Concern No     Special Diet No     Back Care Yes     bad back     Exercise Yes     Bike Helmet Yes     Seat Belt Yes     Self-Exams No     Parent/Sibling W/ Cabg, Mi Or Angioplasty Before 65f 55m? No     Social History Narrative    Social Documentation:7/10        Balanced Diet: YES    Calcium intake: 1200mg per day (supplements),vit    Caffeine: 2-3 per day - soda    Exercise:  type of activity gym;  4-5  times per week    Sunscreen: Yes    Seatbelts:  Yes    Self Testicular Exam: yes    Physical/Emotional/Sexual Abuse: No     Do you feel safe in your environment? Yes        Cholesterol screen up to date: today    Eye Exam up to date: Yes    Dental Exam up to date: Yes    Dexa Scan up to date: Does Not Apply    Colonoscopy up to date: Yes 11/23/2005    Immunizations up to date: Yes    Glucose screen if over 40:  No -         Edgar Soto ma                       Review of Systems  Skin: no changing lesions, no other concerns  Heme: no abnormal bruising or bleeding problems  Neuro: no significant weakness, numbness, tingling.  Patient denies red flag history elements:  Cancer, Unexplained weight loss,, Fever,  "Bladder or bowel incontinence, Urinary retention (with overflow incontinence)    Physical Exam  /90  Pulse 67  Temp 96.3  F (35.7  C) (Tympanic)  Resp 16  Ht 5' 7.5\" (1.715 m)  Wt 179 lb 3.2 oz (81.3 kg)  SpO2 99%  BMI 27.65 kg/m2  Gen: Healthy appearing male in no apparent distress  Neck: Supple, no pain  Chest: normal Resp sounds, no chest wall pain  Abd: no masses or tenderness  Groin: No saddle anesthesia  Some mild tenderness of the SI joints bilaterally spine:Lumbosacral spine area reveals no local tenderness or mass.     Extremities: Normal R & L lower extremity joints for ROM symmetry & tone/ no tenderness/ no effusions/ no crepitus or deformities.  Neurological exam reveals normal without focal findings, muscle tone and strength normal and symmetric, reflexes normal and symmetric, sensation grossly normal, gait and station normal    ASSESSMENT/PLAN:  1. Sacral pain    Patient with a greater than one-year history of low back pain with radiation to the posterior thighs  Lumbar MRI EMGs and pelvic MRI have so far been unable to completely explain his pain  He has seen a sports medicine doctor neurosurgeon and a pelvic specialist  Nerve based therapies with Neurontin at maximum doses have not been effective at all    This is a puzzling case and spent some time reviewing history and imaging and specialty notes    Based on this  I am going to try different approach perhaps this is more of a inflammatory process with radiation of pain from the SI  We will check some inflammatory markers and start a long-acting NSAID such as meloxicam    - ESR: Erythrocyte sedimentation rate  - CRP, inflammation  - CBC with platelets differential  - meloxicam (MOBIC) 15 MG tablet; Take 1 tablet (15 mg) by mouth daily  Dispense: 30 tablet; Refill: 1    2. Hypertension goal BP (blood pressure) < 140/90  patient does need some improvement of his blood pressure previously on atenolol      For a long time does not seem to " really be effective       We will try hydrochlorothiazide- hydrochlorothiazide (HYDRODIURIL) 25 MG tablet; Take 1 tablet (25 mg) by mouth daily  Dispense: 90 tablet; Refill: 3   Renal panel (Alb, BUN, Ca, Cl, CO2, Creat, Gluc, Phos, K, Na)    Follow-up in 3-4 weeks-    Counselled on medication choice, use, side effects of medications, nonprescription adjunct treatment and appropriate follow up. Couns time: 25 minutes of 40. minutes total face to face time.    Josef Ray MD MPH

## 2018-04-10 NOTE — MR AVS SNAPSHOT
After Visit Summary   4/10/2018    Edgardo Dominguez    MRN: [id number]           Patient Information     Date Of Birth          1955        Visit Information        Provider Department      4/10/2018 9:00 AM Josef Ray MD [facility]        Today's Diagnoses     Sacral pain    -  1    Hypertension goal BP (blood pressure) < 140/90          Care Instructions        Take mobic (meloxicam) one per day  Do not take ibuprofen or naproxen    OK to take acetaminophen              Follow-ups after your visit        Your next 10 appointments already scheduled     Apr 25, 2018 10:15 AM CDT   (Arrive by 10:00 AM)   SHAWN Chiropractor with Claus Castillo DC   [facility] [place] [facility] ([facility] [place])    [address] [place] [address]  [address]  [place] [address]   [phone]            May 02, 2018 10:30 AM CDT   Chiro Acupuncture Follow Up with Claus Castillo DC   [facility] [place] [facility] ([facility] [place])    [address] [place] [address]  [address]  [place] [address]   [phone]              Who to contact     If you have questions or need follow up information about today's clinic visit or your schedule please contact [facility] directly at [phone].  Normal or non-critical lab and imaging results will be communicated to you by Centrohart, letter or phone within 4 business days after the clinic has received the results. If you do not hear from us within 7 days, please contact the clinic through Centrohart or phone. If you have a critical or abnormal lab result, we will notify you by phone as soon as possible.  Submit refill requests through ScanÃ¢â‚¬Â¢Jour or call your pharmacy and they will forward the refill request to us. Please allow 3 business days for your refill to be completed.          Additional Information About Your Visit        Centrohart Information     ScanÃ¢â‚¬Â¢Jour gives you secure  "access to your electronic health record. If you see a primary care provider, you can also send messages to your care team and make appointments. If you have questions, please call your primary care clinic.  If you do not have a primary care provider, please call [phone] and they will assist you.        Care EveryWhere ID     This is your Care EveryWhere ID. This could be used by other organizations to access your [facility] medical records  [id number]        Your Vitals Were     Pulse Temperature Respirations Height Pulse Oximetry BMI (Body Mass Index)    67 96.3  F (35.7  C) (Tympanic) 16 5' 7.5\" (1.715 m) 99% 27.65 kg/m2       Blood Pressure from Last 3 Encounters:   04/10/18 150/90   03/19/18 144/87   11/16/17 124/84    Weight from Last 3 Encounters:   04/10/18 179 lb 3.2 oz (81.3 kg)   11/16/17 175 lb (79.4 kg)   10/20/17 178 lb (80.7 kg)              We Performed the Following     CBC with platelets differential     CRP, inflammation     ESR: Erythrocyte sedimentation rate     Renal panel (Alb, BUN, Ca, Cl, CO2, Creat, Gluc, Phos, K, Na)          Today's Medication Changes          These changes are accurate as of 4/10/18  9:38 AM.  If you have any questions, ask your nurse or doctor.               Start taking these medicines.        Dose/Directions    hydrochlorothiazide 25 MG tablet   Commonly known as:  HYDRODIURIL   Used for:  Hypertension goal BP (blood pressure) < 140/90   Started by:  Josef Ray MD        Dose:  25 mg   Take 1 tablet (25 mg) by mouth daily   Quantity:  90 tablet   Refills:  3       meloxicam 15 MG tablet   Commonly known as:  MOBIC   Used for:  Sacral pain   Started by:  Josef Ray MD        Dose:  15 mg   Take 1 tablet (15 mg) by mouth daily   Quantity:  30 tablet   Refills:  1            Where to get your medicines      These medications were sent to [facility] [address] [place] [place] [facility] [address] [facility] [place] [facility] [address]  [address], " [facility] [address]     Phone:  [phone]     hydrochlorothiazide 25 MG tablet    meloxicam 15 MG tablet                Primary Care Provider Office Phone # Fax #    Josef Cholo Ray [phone] [address] [facility] [address]        Equal Access to Services     AILEENBETTY PAMELA : Hadii aad ku hadasho Soomaali, waaxda luqadaha, qaybta kaalmada adeegyada, waxay idiin hayaan adeeg kharash la'aan . So [facility] [phone].    ATENCIÓN: Si habla español, tiene a raygoza disposición servicios gratuitos de asistencia lingüística. Bryant al [phone].    We comply with applicable federal civil rights laws and [place] laws. We do not discriminate on the basis of race, color, national origin, age, disability, sex, sexual orientation, or gender identity.            Thank you!     Thank you for choosing [facility]  for your care. Our goal is always to provide you with excellent care. Hearing back from our patients is one way we can continue to improve our services. Please take a few minutes to complete the written survey that you may receive in the mail after your visit with us. Thank you!             Your Updated Medication List - Protect others around you: Learn how to safely use, store and throw away your medicines at www.disposemymeds.org.          This list is accurate as of 4/10/18  9:38 AM.  Always use your most recent med list.                   Brand Name Dispense Instructions for use Diagnosis    acyclovir 800 MG tablet    ZOVIRAX    30 tablet    TAKE 1 TABLET BY MOUTH THREE TIMES DAILY AT START OF SYMPTOMS    Herpes simplex infection of genitourinary system       ATENOLOL PO      Take 25 mg by mouth daily        cyclobenzaprine 5 MG tablet    FLEXERIL    45 tablet    Take 1-2 tabs as needed at bedtime for pain interrupting sleep        * gabapentin 300 MG capsule    NEURONTIN    90 capsule    Take 1 capsule (300 mg) by mouth 3 times daily    Acute bilateral low back  pain with bilateral sciatica       * gabapentin 600 MG tablet    NEURONTIN    90 tablet    Take 1 tablet (600 mg) by mouth 3 times daily    Lumbar foraminal stenosis       * gabapentin 300 MG capsule    NEURONTIN    270 capsule    Take 3 capsules 3 times/daily    Spinal stenosis of lumbar region with neurogenic claudication       hydrochlorothiazide 25 MG tablet    HYDRODIURIL    90 tablet    Take 1 tablet (25 mg) by mouth daily    Hypertension goal BP (blood pressure) < 140/90       meloxicam 15 MG tablet    MOBIC    30 tablet    Take 1 tablet (15 mg) by mouth daily    Sacral pain       metoprolol succinate 25 MG 24 hr tablet    TOPROL-XL    90 tablet    Take 1 tablet (25 mg) by mouth daily    Hypertension goal BP (blood pressure) < 140/90       mometasone 0.1 % cream    ELOCON    45 g    Apply  topically daily.    Eczema       omeprazole 20 MG CR capsule    priLOSEC    180 capsule    Take 1 capsule (20 mg) by mouth 2 times daily    Gastroesophageal reflux disease without esophagitis       TYLENOL PO           * Notice:  This list has 3 medication(s) that are the same as other medications prescribed for you. Read the directions carefully, and ask your doctor or other care provider to review them with you.

## 2018-04-10 NOTE — PATIENT INSTRUCTIONS
Take mobic (meloxicam) one per day  Do not take ibuprofen or naproxen    OK to take acetaminophen

## 2018-04-25 ENCOUNTER — THERAPY VISIT (OUTPATIENT)
Dept: CHIROPRACTIC MEDICINE | Facility: CLINIC | Age: 63
End: 2018-04-25
Payer: COMMERCIAL

## 2018-04-25 DIAGNOSIS — M99.04 SOMATIC DYSFUNCTION OF SACRAL REGION: ICD-10-CM

## 2018-04-25 DIAGNOSIS — R29.3 POOR POSTURE: ICD-10-CM

## 2018-04-25 DIAGNOSIS — M54.40 CHRONIC BILATERAL LOW BACK PAIN WITH SCIATICA, SCIATICA LATERALITY UNSPECIFIED: Primary | ICD-10-CM

## 2018-04-25 DIAGNOSIS — M99.03 SOMATIC DYSFUNCTION OF LUMBAR REGION: ICD-10-CM

## 2018-04-25 DIAGNOSIS — R20.2 PARESTHESIA: ICD-10-CM

## 2018-04-25 DIAGNOSIS — M53.3 SACRAL PAIN: ICD-10-CM

## 2018-04-25 DIAGNOSIS — G89.29 CHRONIC BILATERAL LOW BACK PAIN WITH SCIATICA, SCIATICA LATERALITY UNSPECIFIED: Primary | ICD-10-CM

## 2018-04-25 DIAGNOSIS — M40.00 ACQUIRED POSTURAL KYPHOSIS: ICD-10-CM

## 2018-04-25 PROCEDURE — 97112 NEUROMUSCULAR REEDUCATION: CPT | Mod: 59 | Performed by: CHIROPRACTOR

## 2018-04-25 PROCEDURE — 99203 OFFICE O/P NEW LOW 30 MIN: CPT | Mod: 25 | Performed by: CHIROPRACTOR

## 2018-04-25 PROCEDURE — 98941 CHIROPRACT MANJ 3-4 REGIONS: CPT | Mod: AT | Performed by: CHIROPRACTOR

## 2018-04-29 PROBLEM — M53.3 SACRAL PAIN: Status: ACTIVE | Noted: 2018-04-29

## 2018-04-29 PROBLEM — R29.3 POOR POSTURE: Status: ACTIVE | Noted: 2018-04-29

## 2018-04-29 PROBLEM — M40.00 ACQUIRED POSTURAL KYPHOSIS: Status: ACTIVE | Noted: 2018-04-29

## 2018-04-29 PROBLEM — M54.40 CHRONIC BILATERAL LOW BACK PAIN WITH SCIATICA, SCIATICA LATERALITY UNSPECIFIED: Status: ACTIVE | Noted: 2018-04-29

## 2018-04-29 PROBLEM — M99.04 SOMATIC DYSFUNCTION OF SACRAL REGION: Status: ACTIVE | Noted: 2018-04-29

## 2018-04-29 PROBLEM — R20.2 PARESTHESIA: Status: ACTIVE | Noted: 2018-04-29

## 2018-04-29 PROBLEM — M99.03 SOMATIC DYSFUNCTION OF LUMBAR REGION: Status: ACTIVE | Noted: 2018-04-29

## 2018-04-29 PROBLEM — G89.29 CHRONIC BILATERAL LOW BACK PAIN WITH SCIATICA, SCIATICA LATERALITY UNSPECIFIED: Status: ACTIVE | Noted: 2018-04-29

## 2018-04-29 NOTE — PROGRESS NOTES
Initial Chiropractic Clinic Visit    PCP: Josef Ray    Edgardo Dominguez is a 62 year old male who is seen  as a self referral presenting with chronic low back pain and leg pain . Patient reports that the onset was April 2017.  When asked, patient denies:, falling, slipping, bending and reaching or sleeping awkwardly. The pt reports B low back and B thigh pain that started April 2017. It seemed to start when he was planting tress, gardening and lifting. He has been evaluated by his PCP and specialist and is currently on Gabapentin for the neurological pain. The pt grades the px a 3-5/10 on VAS. There is not radiation of pain in the extremities. Sitting will increase the pain in the gluteal area. He will feel most of his sx at night. He notes decreased pain when walking. The pt denies weakness or other unusual sx. The pt cannot lay on the R side   Prior to onset, the patient was able to sit for an extended period.. Patient notes that due to symptoms, they can only sit for a short period. Edgardo Dominguez notes   sitting rated at a 5/10 painful, difficult and prior to this incident it was 0/10.        Injury: There was no injury related to this episode     Location of Pain: bilateral low back and hips at the following level(s) T5 , T8 , L5 , Sacrum  and PSIS Right   Duration of Pain: one year    Rating of Pain at worst: 5/10  Rating of Pain Currently: 3/10  Symptoms are better with: walking   Symptoms are worse with: sitting and sleeping, laying on the right side  Additional Features: paresthesias     Health History  as reported by the patient:    How does the patient rate their own health:   Good    Current or past medical history:   High blood pressure    Medical allergies  None    Past Traumas/Surgeries  Other:  Ankle, toe, appendix, vocal chords     Family History  Family History   Problem Relation Age of Onset     Hypertension Mother      Arthritis Mother      C.A.D. Mother      Lipids Mother       Breast Cancer Mother      mastectomy 2012     C.A.D. Father      GASTROINTESTINAL DISEASE Father      Alzheimer Disease Father      Lipids Father      HEART DISEASE Maternal Grandmother      Hypertension Maternal Grandmother      CEREBROVASCULAR DISEASE Maternal Grandmother      HEART DISEASE Maternal Grandfather      CEREBROVASCULAR DISEASE Maternal Grandfather      CEREBROVASCULAR DISEASE Paternal Grandmother      Alzheimer Disease Paternal Grandmother      HEART DISEASE Paternal Grandfather      CEREBROVASCULAR DISEASE Paternal Grandfather      Alcohol/Drug Sister      Depression Sister      Musculoskeletal Disorder Brother      Lipids Brother        Medications:  Anti-inflammatory, High blood pressure and Muscle relaxants    Occupation:  Retired teacher     Primary job tasks:   Other: none     Barriers as home/work:   none    Additional health Issues:     None             Edgardo was asked to complete the Oswestry Low Back Disability Index and Amaury Start Back screening tool, today in the office.  Disability score: 12%. Keel Start Total Score:2 Sub Score: 0     Review of Systems  Musculoskeletal: as above  Remainder of review of systems is negative including constitutional, CV, pulmonary, GI, Skin and Neurologic except as noted in HPI or medical history.    Past Medical History:   Diagnosis Date     Acute sciatica      Esophageal reflux      Hypertension      Other genital herpes      Unspecified disorder of lipoid metabolism      Varicella without mention of complication      Past Surgical History:   Procedure Laterality Date     ARTHRODESIS FOOT Right 3/11/2015    Procedure: ARTHRODESIS FOOT;  Surgeon: Gonzalo Watkins DPM;  Location:  [facility]     C AFF LARYNX SURG PROC UNLISTED  1963    vocal nodules removed     C APPENDECTOMY  1966     C OPEN RX ANKLE DISLOCATN+FIXATN  1978    (L) ORIF ankle     COLONOSCOPY N/A 12/18/2015    Procedure: COLONOSCOPY;  Surgeon: Ruslan Narayan MD;  [facility]:  [facility]     [facility]  "REMOVAL OF TONSILS,12+ Y/O  1984     Objective  There were no vitals taken for this visit.      GENERAL APPEARANCE: healthy, alert and no distress   GAIT: NORMAL  SKIN: no suspicious lesions or rashes  NEURO: Normal strength and tone, mentation intact and speech normal  PSYCH:  mentation appears normal and affect normal/bright    Low back exam:    Inspection:  \"     no visible deformity in the low back       normal skin\",    ROM:       limited flexion due to pain       limited extension due to pain    Tender:       paraspinal muscles       B gluteal and piriformis     Non Tender:       remainder of lumbar spine    Strength:       hip flexion 5/5 Normal       knee extension 5/5 Normal       ankle dorsiflexion 5/5 Normal       ankle plantarflexion 5/5 Normal       dorsiflexion of the great toe 5/5 Normal    Reflexes:       patellar (L3, L4) 2 bilaterally       achilles tendons (S1) 2 bilaterally    Sensation:      grossly intact throughout lower extremities    Special tests:  Kemps - Right positive, low back pain and Left positive, low back pain, SLR - Right negative and Left negative, Gaenslen's - Right negative and Left negative and Fabere - Right positive, hip pain and Left positive, hip pain    Segmental spinal dysfunction/restrictions found at:T5 , T8 , L5 , Sacrum  and PSIS Right     The following soft tissue hypotonicities were observed:Gluteal: right, referred pain: no  Quad lumb: bilateral, referred pain: no  T paraspinals: ache and dull pain, no    Trigger points were found in:none     Muscle spasm found in:Gluteal, Lumbar erector spine, Quad lumb and T-spine paraspinal      Radiology:  IMPRESSION: Mild or early multilevel degenerative disc disease,  greatest in the mid and upper lumbar spine. Mild multilevel foraminal  stenosis, greatest at L3-L4. No central stenosis.    GHASSAN ALVARENGA MD    SEE MRI of the thigh: no pathology noted     Assessment:    1. Chronic bilateral low back pain with sciatica, " sciatica laterality unspecified    2. Somatic dysfunction of lumbar region    3. Sacral pain    4. Somatic dysfunction of sacral region    5. Paresthesia    6. Acquired postural kyphosis    7. Poor posture        RX ordered/plan of care  Anticipated outcomes  Possible risks and side effects    After discussing the risk and benefits of care, patient consented to treatment    Prognosis: Good      Patient's condition:  Patient had restrictions pre-manipulation    Treatment effectiveness:  Post manipulation there is better intersegmental movement and Patient claims to feel looser post manipulation      Plan:    Procedures:  Evaluation and Management:  01503 Moderate level exam 30 min    CMT:  30159 Chiropractic manipulative treatment 3-4 regions performed   Thoracic: Diversified, T6, T9, Prone  Lumbar: Diversified, L5, Side posture  Pelvis: Drop Table, Sacrum , PSIS Right , Prone    Modalities:  None performed this visit    Therapeutic procedures:  39959: Neurological re-education/proprioception training and proper long term sitting posture: Corrected patient's seated posture when sitting for longer than 20 minutes or seated at the computer related to work duties for over 8 hours per day. Fit patient with Audrey lumbar support for postural re-training . Showed patient how to place the support correctly when seated and to increase usage by 2-3 hour increments per day until they are able to sit full time without spinal irritation with demonstration. Related improper vs. proper sitting to spinal biomechanics using the spine model with demonstration with the purpose of PREVENTING premature spinal degeneration from cumulative static motion. Demonstrated the increase in load and shearing forces on the spine in addition to the cumulative degenerative effects of axial compression on a spine that is chronically slumped and in an 'unlocked' position vs a 'locked' position. Gave cervical retraction for proper cervical alignment,  anterior deep cervical flexor strengthening and proprioception training with demonstration. Per 10 minutes total        Response to Treatment  No increase in sx pt stable       Treatment plan and goals:  Goals:  SLEEPING: the patient specific goal is to attain his pre-injury status of 6-8  hours comfortably  SITTING: the patient specific goal is to attain pre-injury status of  2-4 hours comfortably  Laying on right side or on back    Frequency of care  Duration of care is estimated to be 6-8 weeks, from the initial treatment.  It is estimated that the patient will need a total of 6-8 visits to resolve this episode.  For the initial therapeutic trial of care, the frequency is recommended at 1 X week, once daily.  A reevaluation would be clinically appropriate in 6-8 visits, to determine progress and further course of care.    In-Office Treatment  Evaluation  Spinal Chiropractic Manipulative Therapy  Postural correction  US therapy NV  ACP discussion        Recommendations: ACP in 3 visits     Instructions:expect soreness, use lumbar support when seated     Follow-up:    Return to care in 1 week with US therapy   Hip stretching  Hamstring stretching.       Discussed the assessment with the patient.      Disclaimer: This note consists of symbols derived from keyboarding, dictation and/or voice recognition software. As a result, there may be errors in the script that have gone undetected. Please consider this when interpreting information found in this chart.

## 2018-05-02 ENCOUNTER — THERAPY VISIT (OUTPATIENT)
Dept: CHIROPRACTIC MEDICINE | Facility: CLINIC | Age: 63
End: 2018-05-02
Payer: COMMERCIAL

## 2018-05-02 DIAGNOSIS — M99.03 SOMATIC DYSFUNCTION OF LUMBAR REGION: ICD-10-CM

## 2018-05-02 DIAGNOSIS — R29.3 POOR POSTURE: ICD-10-CM

## 2018-05-02 DIAGNOSIS — M54.40 CHRONIC BILATERAL LOW BACK PAIN WITH SCIATICA, SCIATICA LATERALITY UNSPECIFIED: Primary | ICD-10-CM

## 2018-05-02 DIAGNOSIS — M99.04 SOMATIC DYSFUNCTION OF SACRAL REGION: ICD-10-CM

## 2018-05-02 DIAGNOSIS — M40.00 ACQUIRED POSTURAL KYPHOSIS: ICD-10-CM

## 2018-05-02 DIAGNOSIS — R20.2 PARESTHESIA: ICD-10-CM

## 2018-05-02 DIAGNOSIS — G89.29 CHRONIC BILATERAL LOW BACK PAIN WITH SCIATICA, SCIATICA LATERALITY UNSPECIFIED: Primary | ICD-10-CM

## 2018-05-02 DIAGNOSIS — M53.3 SACRAL PAIN: ICD-10-CM

## 2018-05-02 PROCEDURE — 97035 APP MDLTY 1+ULTRASOUND EA 15: CPT | Performed by: CHIROPRACTOR

## 2018-05-02 PROCEDURE — 98941 CHIROPRACT MANJ 3-4 REGIONS: CPT | Mod: AT | Performed by: CHIROPRACTOR

## 2018-05-03 NOTE — PROGRESS NOTES
Visit #:  2    Subjective:  Edgardo Dominguez is a 62 year old male who is seen in f/u up for:        Chronic bilateral low back pain with sciatica, sciatica laterality unspecified  Somatic dysfunction of lumbar region  Sacral pain  Somatic dysfunction of sacral region  Paresthesia  Acquired postural kyphosis  Poor posture.     Since last visit on 4/25/2018,  Edgardo Dominguez reports:    Area of chief complaint:  Thoracic and Lumbar :  Symptoms are graded at 4/10. The quality is described as stiff, achey, dull.  Motion has increased, but is still not normal. The pt reports at least 20% subjective improvement since the previous treatment. He states he has been taking lyrica for 4 days and it seems to be reducing his pain levels. The pt continues to feel pain at the back of the thigh when going to bed or laying down. The pt denies weakness in the extremities or other unusual sx . Patient feels that they are improved due to a reduction in symptoms.     Since last visit the patient feels that they are 20 percent  improved from last visit.       Objective:  The following was observed:    P: pain elicited on palpation: T5, T9, L5, SACRUM, R PSIS  A: static palpation demonstrates intersegmental asymmetry   R: motion palpation notes restricted motion  T: hypertonicity at: Gluteal, Piriformis and Quad lumb Bilaterally    Segmental spinal dysfunction/restrictions found at:  T5, T9, L5, SACRUM, R PSIS      Assessment:    Diagnoses:      1. Chronic bilateral low back pain with sciatica, sciatica laterality unspecified    2. Somatic dysfunction of lumbar region    3. Sacral pain    4. Somatic dysfunction of sacral region    5. Paresthesia    6. Acquired postural kyphosis    7. Poor posture        Patient's condition:  Patient had restrictions pre-manipulation    Treatment effectiveness:  Post manipulation there is better intersegmental movement and Patient claims to feel looser post manipulation      Procedures:  CMT:  44656  Chiropractic manipulative treatment 3-4 regions performed   Thoracic: Diversified, T5, T9, Prone  Lumbar: Diversified, L5, Side posture  Pelvis: Diversified, drop table  Sacrum , PSIS Right , Prone    Modalities:  49260: US:  1.5 Black/cm squared for 8  minutes at 1 mhz  50 pulsed, Location: B piriformis     Therapeutic procedures:  None    Response to Treatment  No Change in symptoms No increase in sx, pt stable     Prognosis: Guarded    Progress towards Goals: Patient is making progress towards the goal:   Goals:  SLEEPING: the patient specific goal is to attain his pre-injury status of 6-8  hours comfortably  SITTING: the patient specific goal is to attain pre-injury status of  2-4 hours comfortably  Laying on right side or on back     Recommendations:    Instructions:use lumbar support at all times    Follow-up:  Return to care in 1 week with ACP.   Piriformis stretching   hamstring stretching

## 2018-05-09 ENCOUNTER — THERAPY VISIT (OUTPATIENT)
Dept: CHIROPRACTIC MEDICINE | Facility: CLINIC | Age: 63
End: 2018-05-09
Payer: COMMERCIAL

## 2018-05-09 DIAGNOSIS — G89.29 CHRONIC BILATERAL LOW BACK PAIN WITH SCIATICA, SCIATICA LATERALITY UNSPECIFIED: Primary | ICD-10-CM

## 2018-05-09 DIAGNOSIS — M40.00 ACQUIRED POSTURAL KYPHOSIS: ICD-10-CM

## 2018-05-09 DIAGNOSIS — R20.2 PARESTHESIA: ICD-10-CM

## 2018-05-09 DIAGNOSIS — M99.03 SOMATIC DYSFUNCTION OF LUMBAR REGION: ICD-10-CM

## 2018-05-09 DIAGNOSIS — M53.3 SACRAL PAIN: ICD-10-CM

## 2018-05-09 DIAGNOSIS — M54.40 CHRONIC BILATERAL LOW BACK PAIN WITH SCIATICA, SCIATICA LATERALITY UNSPECIFIED: Primary | ICD-10-CM

## 2018-05-09 DIAGNOSIS — M99.04 SOMATIC DYSFUNCTION OF SACRAL REGION: ICD-10-CM

## 2018-05-09 PROBLEM — R29.3 POOR POSTURE: Status: RESOLVED | Noted: 2018-04-29 | Resolved: 2018-05-09

## 2018-05-09 PROCEDURE — 97810 ACUP 1/> WO ESTIM 1ST 15 MIN: CPT | Mod: GA | Performed by: CHIROPRACTOR

## 2018-05-09 PROCEDURE — 98941 CHIROPRACT MANJ 3-4 REGIONS: CPT | Mod: AT | Performed by: CHIROPRACTOR

## 2018-05-09 NOTE — PROGRESS NOTES
Visit #:  3    Subjective:  Edgardo Dominguez is a 62 year old male who is seen in f/u up for:        Chronic bilateral low back pain with sciatica, sciatica laterality unspecified  Somatic dysfunction of lumbar region  Sacral pain  Somatic dysfunction of sacral region  Paresthesia  Acquired postural kyphosis.     Since last visit,   Edgardo Dominguez reports:    Area of chief complaint:  Thoracic and Lumbar :  Symptoms are graded at 2/10. The quality is described as stiff, achey, dull.  Motion has increased, but is still not normal. The pt reports at least 50 % improvement since initial presentation. He denies constant pain in both legs and with the stretching has reduced pain levels in half. He is able to sleep a full night and notes less leg pain waking him up during the night. The pt irritated the low back area this week by performing yard work. He denies weakness or other unusual sx.     Since last visit the patient feels that they are 50 percent  improved from last visit.       Objective:  The following was observed:    P: pain elicited on palpation: T5, T9, L5, SACRUM, R PSIS  A: static palpation demonstrates intersegmental asymmetry   R: motion palpation notes restricted motion  T: hypertonicity at: Gluteal, Piriformis and Quad lumb Bilaterally    Segmental spinal dysfunction/restrictions found at:  T5, T9, L5, SACRUM, R PSIS      Assessment:    Diagnoses:      1. Chronic bilateral low back pain with sciatica, sciatica laterality unspecified    2. Somatic dysfunction of lumbar region    3. Sacral pain    4. Somatic dysfunction of sacral region    5. Paresthesia    6. Acquired postural kyphosis        Patient's condition:  Patient had restrictions pre-manipulation    Treatment effectiveness:  Post manipulation there is better intersegmental movement and Patient claims to feel looser post manipulation      Procedures:  CMT:  76917 Chiropractic manipulative treatment 3-4 regions performed   Thoracic: Diversified,  T5, T9, Prone  Lumbar: Diversified, L5, Side posture  Pelvis: Diversified, drop table  Sacrum , PSIS Right , Prone    Modalities:  ACP: Keyla points of the lumbar spine  ja points of the hips and legs  15 minutes prone    Therapeutic procedures:  None    Response to Treatment  No Change in symptoms No increase in sx, pt stable     Prognosis: Guarded    Progress towards Goals: Patient is making progress towards the goal:   Goals:  SLEEPING: the patient specific goal is to attain his pre-injury status of 6-8  hours comfortably  SITTING: the patient specific goal is to attain pre-injury status of  2-4 hours comfortably  Laying on right side or on back     Recommendations:    Instructions:use lumbar support at all times    Follow-up:  Return to care in 1 week with ACP.   Piriformis stretching*  hamstring stretching *  Hip stretching

## 2018-05-16 ENCOUNTER — THERAPY VISIT (OUTPATIENT)
Dept: CHIROPRACTIC MEDICINE | Facility: CLINIC | Age: 63
End: 2018-05-16
Payer: COMMERCIAL

## 2018-05-16 DIAGNOSIS — G89.29 CHRONIC BILATERAL LOW BACK PAIN WITH SCIATICA, SCIATICA LATERALITY UNSPECIFIED: Primary | ICD-10-CM

## 2018-05-16 DIAGNOSIS — M40.00 ACQUIRED POSTURAL KYPHOSIS: ICD-10-CM

## 2018-05-16 DIAGNOSIS — M99.04 SOMATIC DYSFUNCTION OF SACRAL REGION: ICD-10-CM

## 2018-05-16 DIAGNOSIS — R20.2 PARESTHESIA: ICD-10-CM

## 2018-05-16 DIAGNOSIS — M99.03 SOMATIC DYSFUNCTION OF LUMBAR REGION: ICD-10-CM

## 2018-05-16 DIAGNOSIS — M54.40 CHRONIC BILATERAL LOW BACK PAIN WITH SCIATICA, SCIATICA LATERALITY UNSPECIFIED: Primary | ICD-10-CM

## 2018-05-16 DIAGNOSIS — M53.3 SACRAL PAIN: ICD-10-CM

## 2018-05-16 PROCEDURE — 97110 THERAPEUTIC EXERCISES: CPT | Performed by: CHIROPRACTOR

## 2018-05-16 PROCEDURE — 98941 CHIROPRACT MANJ 3-4 REGIONS: CPT | Mod: AT | Performed by: CHIROPRACTOR

## 2018-05-16 PROCEDURE — 97810 ACUP 1/> WO ESTIM 1ST 15 MIN: CPT | Mod: GA | Performed by: CHIROPRACTOR

## 2018-05-16 NOTE — PROGRESS NOTES
Visit #:  4    Subjective:  Edgardo Dominguez is a 62 year old male who is seen in f/u up for:        Chronic bilateral low back pain with sciatica, sciatica laterality unspecified  Somatic dysfunction of lumbar region  Sacral pain  Somatic dysfunction of sacral region  Paresthesia  Acquired postural kyphosis.     Since last visit,   Edgardo Dominguez reports:    Area of chief complaint:  Thoracic and Lumbar :  Symptoms are graded at 3/10. The quality is described as stiff, achey, dull.  Motion has increased, but is still not normal. The pt reports at least 50 % improvement since initial presentation. He states the pain in the legs increased gradually during the past week and he noted a difficult time sleeping. There was no specific incident that could have caused the increase in pain. 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     Since last visit the patient feels that they are 50 percent  improved from last visit-no change, slight exacerbation       Objective:  The following was observed:    P: pain elicited on palpation: T5, T9, L5, SACRUM, R PSIS  A: static palpation demonstrates intersegmental asymmetry   R: motion palpation notes restricted motion  T: hypertonicity at: Gluteal, Piriformis and Quad lumb Bilaterally    Segmental spinal dysfunction/restrictions found at:  T5, T9, L5, SACRUM, R PSIS      Assessment:    Diagnoses:      1. Chronic bilateral low back pain with sciatica, sciatica laterality unspecified    2. Somatic dysfunction of lumbar region    3. Sacral pain    4. Somatic dysfunction of sacral region    5. Paresthesia    6. Acquired postural kyphosis        Patient's condition:  Slight exacerbation    Treatment effectiveness:  Post manipulation there is better intersegmental movement and Patient claims to feel looser post manipulation      Procedures:  CMT:  67416 Chiropractic manipulative treatment 3-4 regions performed   Thoracic: Diversified, T5, T9,  Prone  Lumbar: Diversified, L5, Side posture  Pelvis: Diversified, drop table  Sacrum , PSIS Right , Prone    Modalities:  ACP: Keyla points of the lumbar spine  ja points of the hips and legs  15 minutes prone    Therapeutic procedures:  Gave hamstring stretch supine with use of a belt or towel for maximum stretch. Gave standing hamstring stretch and nerve traction as per stretch protocol with demonstration.   Gave hamstring stretch supine with use of a belt or towel for maximum stretch. Gave standing hamstring stretch and nerve traction as per stretch protocol with demonstration.   Gave piriformis stretching with demonstration    Per 10 minutes     Response to Treatment  No Change in symptoms No increase in sx, pt stable     Prognosis: Guarded    Progress towards Goals: Patient is making progress towards the goal:   Goals:  SLEEPING: the patient specific goal is to attain his pre-injury status of 6-8  hours comfortably  SITTING: the patient specific goal is to attain pre-injury status of  2-4 hours comfortably  Laying on right side or on back     Recommendations:    Instructions:use lumbar support at all times    Follow-up:  Return to care in 1 week with ACP.

## 2018-05-23 ENCOUNTER — THERAPY VISIT (OUTPATIENT)
Dept: CHIROPRACTIC MEDICINE | Facility: CLINIC | Age: 63
End: 2018-05-23
Payer: COMMERCIAL

## 2018-05-23 DIAGNOSIS — M40.00 ACQUIRED POSTURAL KYPHOSIS: ICD-10-CM

## 2018-05-23 DIAGNOSIS — M99.03 SOMATIC DYSFUNCTION OF LUMBAR REGION: ICD-10-CM

## 2018-05-23 DIAGNOSIS — M54.40 CHRONIC BILATERAL LOW BACK PAIN WITH SCIATICA, SCIATICA LATERALITY UNSPECIFIED: Primary | ICD-10-CM

## 2018-05-23 DIAGNOSIS — R20.2 PARESTHESIA: ICD-10-CM

## 2018-05-23 DIAGNOSIS — M53.3 SACRAL PAIN: ICD-10-CM

## 2018-05-23 DIAGNOSIS — G89.29 CHRONIC BILATERAL LOW BACK PAIN WITH SCIATICA, SCIATICA LATERALITY UNSPECIFIED: Primary | ICD-10-CM

## 2018-05-23 DIAGNOSIS — M99.04 SOMATIC DYSFUNCTION OF SACRAL REGION: ICD-10-CM

## 2018-05-23 PROCEDURE — 97810 ACUP 1/> WO ESTIM 1ST 15 MIN: CPT | Mod: GA | Performed by: CHIROPRACTOR

## 2018-05-23 PROCEDURE — 98941 CHIROPRACT MANJ 3-4 REGIONS: CPT | Mod: AT | Performed by: CHIROPRACTOR

## 2018-05-23 NOTE — PROGRESS NOTES
Visit #:  5    Subjective:  Edgardo Dominguez is a 62 year old male who is seen in f/u up for:        Chronic bilateral low back pain with sciatica, sciatica laterality unspecified  Somatic dysfunction of lumbar region  Sacral pain  Somatic dysfunction of sacral region  Paresthesia  Acquired postural kyphosis.     Since last visit,   Edgardo Dominguez reports:    Area of chief complaint:  Thoracic and Lumbar :  Symptoms are graded at 3/10. The quality is described as stiff, achey, dull.  Motion has increased, but is still not normal. The pt reports 25% improvement overall. He noted moderate soreness in the low back area for 3 days post treatment and needed to take a lyrica. His legs were burning at night and he had issues sleeping. He states since the weekend and going forward the pain has decreased and he notes overall improvement in the legs and when sleeping at night. He continues to deny weakness in the extremities.     Since last visit the patient feels that they are 25 percent  improved from last visit-no change, slight exacerbation       Objective:  The following was observed:    P: pain elicited on palpation: T5, T9, L5, SACRUM, R PSIS  A: static palpation demonstrates intersegmental asymmetry   R: motion palpation notes restricted motion  T: hypertonicity at: Gluteal, Piriformis and Quad lumb Bilaterally    Segmental spinal dysfunction/restrictions found at:  T5, T9, L5, SACRUM, R PSIS      Assessment:    Diagnoses:      1. Chronic bilateral low back pain with sciatica, sciatica laterality unspecified    2. Somatic dysfunction of lumbar region    3. Sacral pain    4. Somatic dysfunction of sacral region    5. Paresthesia    6. Acquired postural kyphosis        Patient's condition:  Overall pt responding well to therapy    Treatment effectiveness:  Post manipulation there is better intersegmental movement and Patient claims to feel looser post manipulation      Procedures:  CMT:  38325 Chiropractic  manipulative treatment 3-4 regions performed   Thoracic: Diversified, T5, T9, Prone  Lumbar: Diversified, L5, Side posture  Pelvis: Diversified, drop table  Sacrum , PSIS Right , Prone    Modalities:  ACP: Keyla points of the lumbar spine  ja points of the hips and legs  15 minutes prone    Therapeutic procedures:  None     Per 10 minutes     Response to Treatment  No Change in symptoms No increase in sx, pt stable     Prognosis: Guarded    Progress towards Goals: Patient is making progress towards the goal:   Goals:  SLEEPING: the patient specific goal is to attain his pre-injury status of 6-8  hours comfortably  SITTING: the patient specific goal is to attain pre-injury status of  2-4 hours comfortably  Laying on right side or on back     Recommendations:    Instructions:use lumbar support at all times    Follow-up:  Return to care in 1 week with ACP.

## 2018-05-24 ENCOUNTER — OFFICE VISIT (OUTPATIENT)
Dept: FAMILY MEDICINE | Facility: CLINIC | Age: 63
End: 2018-05-24
Payer: COMMERCIAL

## 2018-05-24 VITALS
DIASTOLIC BLOOD PRESSURE: 87 MMHG | HEART RATE: 68 BPM | OXYGEN SATURATION: 98 % | SYSTOLIC BLOOD PRESSURE: 133 MMHG | BODY MASS INDEX: 27.27 KG/M2 | TEMPERATURE: 96.4 F | RESPIRATION RATE: 16 BRPM | WEIGHT: 176.7 LBS

## 2018-05-24 DIAGNOSIS — I10 HYPERTENSION GOAL BP (BLOOD PRESSURE) < 140/90: ICD-10-CM

## 2018-05-24 DIAGNOSIS — M53.3 SACRAL PAIN: ICD-10-CM

## 2018-05-24 LAB
ANION GAP SERPL CALCULATED.3IONS-SCNC: 6 MMOL/L (ref 3–14)
BUN SERPL-MCNC: 15 MG/DL (ref 7–30)
CALCIUM SERPL-MCNC: 9.2 MG/DL (ref 8.5–10.1)
CHLORIDE SERPL-SCNC: 101 MMOL/L (ref 94–109)
CO2 SERPL-SCNC: 27 MMOL/L (ref 20–32)
CREAT SERPL-MCNC: 0.92 MG/DL (ref 0.66–1.25)
GFR SERPL CREATININE-BSD FRML MDRD: 83 ML/MIN/1.7M2
GLUCOSE SERPL-MCNC: 100 MG/DL (ref 70–99)
POTASSIUM SERPL-SCNC: 4.2 MMOL/L (ref 3.4–5.3)
SODIUM SERPL-SCNC: 134 MMOL/L (ref 133–144)

## 2018-05-24 PROCEDURE — 36415 COLL VENOUS BLD VENIPUNCTURE: CPT | Performed by: FAMILY MEDICINE

## 2018-05-24 PROCEDURE — 80048 BASIC METABOLIC PNL TOTAL CA: CPT | Performed by: FAMILY MEDICINE

## 2018-05-24 PROCEDURE — 99214 OFFICE O/P EST MOD 30 MIN: CPT | Performed by: FAMILY MEDICINE

## 2018-05-24 NOTE — PROGRESS NOTES
SUBJECTIVE:   Edgardo Dominguez is a 62 year old male who presents to clinic today for the following health issues:      Hypertension Follow-up      Outpatient blood pressures are not being checked.    Low Salt Diet: no added salt      Amount of exercise or physical activity: 2-3 days/week for an average of 30-45 minutes    Problems taking medications regularly: No    Medication side effects: none    Diet: regular (no restrictions)    Also chronic low back and bilateral leg pain x 1 year somewhat resiusteant to investigations and treatment. Pain is located in bilateral low back and radiates down posterior legs into the thighs. Describes the pain as constant and achy. Pain is worsened with chiropractic treatment or sitting. Pain is alleviated with walking. He has had 2 ESIs, most recent LESI Oct 2017 with minimal improvement. He did complete PT as well and has continued to perform home exercises. EMG from Oct 2017 of BLE negative. Bilateral hamstring MRIs negative. Denies bladder/bowel incontinence or balance issues.  Then saw Pelvic specialist, Dr. Díaz and that pelvic MRI was normal    Because this was puzzling I tried  different approach perhaps this is more of a inflammatory process with radiation of pain from the SI  We will check some inflammatory markers and start a long-acting NSAID such as meloxicam  He thinks that this may have been helpful but he is now run out and wonders if he should continue to take it because he has also been seeing chiropractic getting some acupuncture and these also seem to be helpful  Also he is occasionally been taking Lyrica        ROS: As per HPI.    CV: no palpitations, no chest pain, no lower extremity swelling.  Resp: no shortness of breath, wheezing, or cough.    I have reviewed and updated the patient's past medical history in the EMR. Current problems are:    Patient Active Problem List   Diagnosis     Esophageal reflux     Other genital herpes     Hyperlipidemia LDL goal  <160     Hypertension goal BP (blood pressure) < 140/90     Advanced directives, counseling/discussion     Hallux limitus of right foot     's knee, left     Herpes simplex infection of genitourinary system     Chronic bilateral low back pain with sciatica, sciatica laterality unspecified     Somatic dysfunction of lumbar region     Sacral pain     Somatic dysfunction of sacral region     Paresthesia     Acquired postural kyphosis     Past Surgical History:   Procedure Laterality Date     ARTHRODESIS FOOT Right 3/11/2015    Procedure: ARTHRODESIS FOOT;  Surgeon: Gonzalo Watkins DPM;  Location:  [facility]     C AFF LARYNX SURG PROC UNLISTED  1963    vocal nodules removed     C APPENDECTOMY  1966     C OPEN RX ANKLE DISLOCATN+FIXATN  1978    (L) ORIF ankle     COLONOSCOPY N/A 12/18/2015    Procedure: COLONOSCOPY;  Surgeon: Ruslan Narayan MD;  Location:  [facility]     HC REMOVAL OF TONSILS,12+ Y/O  1984       Social History   Substance Use Topics     Smoking status: Never Smoker     Smokeless tobacco: Never Used     Alcohol use 0.0 oz/week     0 Standard drinks or equivalent per week      Comment: 3 a week      Family History   Problem Relation Age of Onset     Hypertension Mother      Arthritis Mother      C.A.D. Mother      Lipids Mother      Breast Cancer Mother      mastectomy 2012     C.A.D. Father      GASTROINTESTINAL DISEASE Father      Alzheimer Disease Father      Lipids Father      HEART DISEASE Maternal Grandmother      Hypertension Maternal Grandmother      CEREBROVASCULAR DISEASE Maternal Grandmother      HEART DISEASE Maternal Grandfather      CEREBROVASCULAR DISEASE Maternal Grandfather      CEREBROVASCULAR DISEASE Paternal Grandmother      Alzheimer Disease Paternal Grandmother      HEART DISEASE Paternal Grandfather      CEREBROVASCULAR DISEASE Paternal Grandfather      Alcohol/Drug Sister      Depression Sister      Musculoskeletal Disorder Brother      Lipids Brother          Allergies,  reviewed:     Allergies   Allergen Reactions     Hydrocodone Nausea     Oxycodone Nausea         Current Outpatient Prescriptions on File Prior to Visit:  cyclobenzaprine (FLEXERIL) 5 MG tablet Take 1-2 tabs as needed at bedtime for pain interrupting sleep   hydrochlorothiazide (HYDRODIURIL) 25 MG tablet Take 1 tablet (25 mg) by mouth daily   mometasone (ELOCON) 0.1 % cream Apply  topically daily.   omeprazole (PRILOSEC) 20 MG CR capsule Take 1 capsule (20 mg) by mouth 2 times daily   Acetaminophen (TYLENOL PO)    acyclovir (ZOVIRAX) 800 MG tablet TAKE 1 TABLET BY MOUTH THREE TIMES DAILY AT START OF SYMPTOMS (Patient not taking: Reported on 3/19/2018)   gabapentin (NEURONTIN) 300 MG capsule Take 1 capsule (300 mg) by mouth 3 times daily (Patient not taking: Reported on 11/16/2017)   meloxicam (MOBIC) 15 MG tablet Take 1 tablet (15 mg) by mouth daily     No current facility-administered medications on file prior to visit.       Objective:  /87  Pulse 68  Temp 96.4  F (35.8  C) (Tympanic)  Resp 16  Wt 176 lb 11.2 oz (80.2 kg)  SpO2 98%  BMI 27.27 kg/m2  General Appearance: Pleasant, alert, WN/WD in no acute respiratory distress.  Neurologic Exam: Nonfocal, no tremor. Normal gait.  Psychiatric Exam: Alert - appropriate, normal affect      BP Readings from Last 3 Encounters:   05/24/18 133/87   04/10/18 150/90   03/19/18 144/87    Wt Readings from Last 3 Encounters:   05/24/18 176 lb 11.2 oz (80.2 kg)   04/10/18 179 lb 3.2 oz (81.3 kg)   11/16/17 175 lb (79.4 kg)          Recent Labs   Lab Test  04/10/18   0940  05/01/17   0850  12/27/16   0754  06/30/16   1047  03/03/15   1610   08/07/13   0830  07/02/13   0916  07/18/12   0906   LDL   --    --   106*  107*  101   < >   --   115  100   HDL   --    --   34*  41  47   < >   --   44  49   TRIG   --    --   215*  88  51   < >   --   59  60   ALT   --    --    --    --    --    --   24  34  35   CR  0.93  0.92   --    --   0.90   < >   --   0.88  0.99    GFRESTIMATED  82  84   --    --   87   < >   --   89  78   GFRESTBLACK  >90  >90  African American GFR Calc     --    --   >90   GFR Calc     < >   --   >90  >90   POTASSIUM  4.1  3.9   --    --   4.1   < >   --   3.8  4.1    < > = values in this interval not displayed.        ASSESSMENT, PLAN:  SUBJECTIVE:  1. Sacral pain  Started some lyrica, and that has been helpful leg is okay to continue that for now  Also was on some mobic  Also trying accupuncture/Chiro    He will stop mobic for now to see if it was making a  difference    This pain is likely mildly multifactorial but glad that it has been somewhat improving    2. Hypertension goal BP (blood pressure) < 140/90  Improved, will stick with hydrochlorothiazide alone for now because of the other things going on      Follow up: Return as needed if symptoms fail to improve

## 2018-05-24 NOTE — MR AVS SNAPSHOT
After Visit Summary   5/24/2018    Edgardo Dominguez    MRN: [id number]           Patient Information     Date Of Birth          1955        Visit Information        Provider Department      5/24/2018 10:00 AM Josef Ray MD [facility]        Today's Diagnoses     Sacral pain        Hypertension goal BP (blood pressure) < 140/90           Follow-ups after your visit        Follow-up notes from your care team     Return in about 3 months (around 8/24/2018), or if symptoms worsen or fail to improve.      Your next 10 appointments already scheduled     May 30, 2018  9:30 AM CDT   Chiro Acupuncture Follow Up with Claus Castillo DC   [facility] [place] [facility] ([facility] [place])    [address]  [address]  [place] [address] [phone]              Who to contact     If you have questions or need follow up information about today's clinic visit or your schedule please contact [facility] directly at [phone].  Normal or non-critical lab and imaging results will be communicated to you by [employer], letter or phone within 4 business days after the clinic has received the results. If you do not hear from us within 7 days, please contact the clinic through [employer] or phone. If you have a critical or abnormal lab result, we will notify you by phone as soon as possible.  Submit refill requests through [employer] or call your pharmacy and they will forward the refill request to us. Please allow 3 business days for your refill to be completed.          Additional Information About Your Visit        MyChart Information     [employer] gives you secure access to your electronic health record. If you see a primary care provider, you can also send messages to your care team and make appointments. If you have questions, please call your primary care clinic.  If you do not have a primary care provider, please call [phone]  and they will assist you.        Care EveryWhere ID     This is your Care EveryWhere ID. This could be used by other organizations to access your [place] medical records  [id number]        Your Vitals Were     Pulse Temperature Respirations Pulse Oximetry BMI (Body Mass Index)       68 96.4  F (35.8  C) (Tympanic) 16 98% 27.27 kg/m2        Blood Pressure from Last 3 Encounters:   05/24/18 133/87   04/10/18 150/90   03/19/18 144/87    Weight from Last 3 Encounters:   05/24/18 176 lb 11.2 oz (80.2 kg)   04/10/18 179 lb 3.2 oz (81.3 kg)   11/16/17 175 lb (79.4 kg)              We Performed the Following     Basic metabolic panel          Today's Medication Changes          These changes are accurate as of 5/24/18 12:05 PM.  If you have any questions, ask your nurse or doctor.               Stop taking these medicines if you haven't already. Please contact your care team if you have questions.     gabapentin 300 MG capsule   Commonly known as:  NEURONTIN   Stopped by:  Josef Ray MD                    Primary Care Provider Office Phone # Fax #    Josef Ray [phone] [address] [address]        Equal Access to Services     BETTY [facility] : Hadii son ku hadasho Soomaali, waaxda luqadaha, qaybta kaalmada adeegyada, mahsa suresh. So [facility] [phone].    ATENCIÓN: Si habla español, tiene a raygoza disposición servicios gratuitos de asistencia lingüística. Llame al [phone].    We comply with applicable federal civil rights laws and [place] laws. We do not discriminate on the basis of race, color, national origin, age, disability, sex, sexual orientation, or gender identity.            Thank you!     Thank you for choosing [facility]  for your care. Our goal is always to provide you with excellent care. Hearing back from our patients is one way we can continue to improve our services. Please take a few minutes  to complete the written survey that you may receive in the mail after your visit with us. Thank you!             Your Updated Medication List - Protect others around you: Learn how to safely use, store and throw away your medicines at www.disposemymeds.org.          This list is accurate as of 5/24/18 12:05 PM.  Always use your most recent med list.                   Brand Name Dispense Instructions for use Diagnosis    acyclovir 800 MG tablet    ZOVIRAX    30 tablet    TAKE 1 TABLET BY MOUTH THREE TIMES DAILY AT START OF SYMPTOMS    Herpes simplex infection of genitourinary system       cyclobenzaprine 5 MG tablet    FLEXERIL    45 tablet    Take 1-2 tabs as needed at bedtime for pain interrupting sleep        hydrochlorothiazide 25 MG tablet    HYDRODIURIL    90 tablet    Take 1 tablet (25 mg) by mouth daily    Hypertension goal BP (blood pressure) < 140/90       LYRICA PO      Take 75 mg by mouth 2 times daily        meloxicam 15 MG tablet    MOBIC    30 tablet    Take 1 tablet (15 mg) by mouth daily    Sacral pain       mometasone 0.1 % cream    ELOCON    45 g    Apply  topically daily.    Eczema       omeprazole 20 MG CR capsule    priLOSEC    180 capsule    Take 1 capsule (20 mg) by mouth 2 times daily    Gastroesophageal reflux disease without esophagitis       TYLENOL PO

## 2018-05-30 ENCOUNTER — THERAPY VISIT (OUTPATIENT)
Dept: CHIROPRACTIC MEDICINE | Facility: CLINIC | Age: 63
End: 2018-05-30
Payer: COMMERCIAL

## 2018-05-30 DIAGNOSIS — R20.2 PARESTHESIA: ICD-10-CM

## 2018-05-30 DIAGNOSIS — G89.29 CHRONIC BILATERAL LOW BACK PAIN WITH SCIATICA, SCIATICA LATERALITY UNSPECIFIED: Primary | ICD-10-CM

## 2018-05-30 DIAGNOSIS — M99.03 SOMATIC DYSFUNCTION OF LUMBAR REGION: ICD-10-CM

## 2018-05-30 DIAGNOSIS — M40.00 ACQUIRED POSTURAL KYPHOSIS: ICD-10-CM

## 2018-05-30 DIAGNOSIS — M99.04 SOMATIC DYSFUNCTION OF SACRAL REGION: ICD-10-CM

## 2018-05-30 DIAGNOSIS — M54.40 CHRONIC BILATERAL LOW BACK PAIN WITH SCIATICA, SCIATICA LATERALITY UNSPECIFIED: Primary | ICD-10-CM

## 2018-05-30 DIAGNOSIS — M53.3 SACRAL PAIN: ICD-10-CM

## 2018-05-30 PROCEDURE — 97810 ACUP 1/> WO ESTIM 1ST 15 MIN: CPT | Mod: GA | Performed by: CHIROPRACTOR

## 2018-05-30 PROCEDURE — 98941 CHIROPRACT MANJ 3-4 REGIONS: CPT | Mod: AT | Performed by: CHIROPRACTOR

## 2018-05-30 NOTE — PROGRESS NOTES
Visit #:  6    Subjective:  Edgardo Dominguez is a 62 year old male who is seen in f/u up for:        Chronic bilateral low back pain with sciatica, sciatica laterality unspecified  Somatic dysfunction of lumbar region  Sacral pain  Somatic dysfunction of sacral region  Paresthesia  Acquired postural kyphosis.     Since last visit,   Edgardo Dominguez reports:    Area of chief complaint:  Thoracic and Lumbar :  Symptoms are graded at 3/10. The quality is described as stiff, achey, dull.  Motion has increased, but is still not normal. The pt reports 75% improved for 5 days post treatment however Monday he noted a moderate increase in pain levels due to weather changes or inactivity. He was forced to take medication for the issue. Today he woke up with a slight soreness in the low back and some aching in the legs graded a 3/10 on VAS.     Since last visit the patient feels that they are 75 percent  improved from last visit-     Objective:  The following was observed:    P: pain elicited on palpation: T5, T9, L5, SACRUM, R PSIS  A: static palpation demonstrates intersegmental asymmetry   R: motion palpation notes restricted motion  T: hypertonicity at: Gluteal, Piriformis and Quad lumb Bilaterally    Segmental spinal dysfunction/restrictions found at:  T5, T9, L5, SACRUM, R PSIS      Assessment:    Diagnoses:      1. Chronic bilateral low back pain with sciatica, sciatica laterality unspecified    2. Somatic dysfunction of lumbar region    3. Sacral pain    4. Somatic dysfunction of sacral region    5. Paresthesia    6. Acquired postural kyphosis        Patient's condition:  Overall pt responding well to therapy and continually improving    Treatment effectiveness:  Post manipulation there is better intersegmental movement and Patient claims to feel looser post manipulation      Procedures:  CMT:  90322 Chiropractic manipulative treatment 3-4 regions performed   Thoracic: Diversified, T5, T9, Prone  Lumbar: Diversified,  L5, Side posture  Pelvis: Diversified, drop table  Sacrum , PSIS Right , Prone    Modalities:  ACP: Keyla points of the lumbar spine  ja points of the hips and legs  15 minutes prone    Therapeutic procedures:  Review of piriformis stretching       Response to Treatment  No Change in symptoms No increase in sx, pt stable     Prognosis: Guarded    Progress towards Goals: Patient is making progress towards the goal:   Goals:  SLEEPING: the patient specific goal is to attain his pre-injury status of 6-8  hours comfortably  SITTING: the patient specific goal is to attain pre-injury status of  2-4 hours comfortably  Laying on right side or on back     Recommendations:    Instructions:use lumbar support at all times    Follow-up:  Return to care in 1 week with ACP.

## 2018-06-13 ENCOUNTER — THERAPY VISIT (OUTPATIENT)
Dept: CHIROPRACTIC MEDICINE | Facility: CLINIC | Age: 63
End: 2018-06-13
Payer: COMMERCIAL

## 2018-06-13 DIAGNOSIS — M99.03 SOMATIC DYSFUNCTION OF LUMBAR REGION: ICD-10-CM

## 2018-06-13 DIAGNOSIS — M53.3 SACRAL PAIN: ICD-10-CM

## 2018-06-13 DIAGNOSIS — R20.2 PARESTHESIA: ICD-10-CM

## 2018-06-13 DIAGNOSIS — G89.29 CHRONIC BILATERAL LOW BACK PAIN WITH SCIATICA, SCIATICA LATERALITY UNSPECIFIED: Primary | ICD-10-CM

## 2018-06-13 DIAGNOSIS — M54.40 CHRONIC BILATERAL LOW BACK PAIN WITH SCIATICA, SCIATICA LATERALITY UNSPECIFIED: Primary | ICD-10-CM

## 2018-06-13 DIAGNOSIS — M99.04 SOMATIC DYSFUNCTION OF SACRAL REGION: ICD-10-CM

## 2018-06-13 PROCEDURE — 97810 ACUP 1/> WO ESTIM 1ST 15 MIN: CPT | Mod: GA | Performed by: CHIROPRACTOR

## 2018-06-13 PROCEDURE — 98941 CHIROPRACT MANJ 3-4 REGIONS: CPT | Mod: AT | Performed by: CHIROPRACTOR

## 2018-06-14 PROBLEM — M40.00 ACQUIRED POSTURAL KYPHOSIS: Status: RESOLVED | Noted: 2018-04-29 | Resolved: 2018-06-14

## 2018-06-14 NOTE — PROGRESS NOTES
Visit #:  7    Subjective:  Edgardo Dominguez is a 62 year old male who is seen in f/u up for:        Chronic bilateral low back pain with sciatica, sciatica laterality unspecified  Somatic dysfunction of lumbar region  Sacral pain  Somatic dysfunction of sacral region  Paresthesia  Acquired postural kyphosis.     Since last visit,   Edgardo Dominguez reports:    Area of chief complaint:  Thoracic and Lumbar :  Symptoms are graded at 3/10. The quality is described as stiff, achey, dull.  Motion has increased, but is still not normal. The pt reports 75% improved overall. He has not significantly changed since the prior treatment. He states he feels burning in the legs before he goes to bed. He took medication one time. The pt denies weakness or other unusual sx.     Since last visit the patient feels that they are 75 percent  improved from last visit-no change      Objective:  The following was observed:    P: pain elicited on palpation: T5, T9, L5, SACRUM, R PSIS  A: static palpation demonstrates intersegmental asymmetry   R: motion palpation notes restricted motion  T: hypertonicity at: Gluteal, Piriformis and Quad lumb Bilaterally    Segmental spinal dysfunction/restrictions found at:  T5, T9, L5, SACRUM, R PSIS      Assessment:    Diagnoses:      1. Chronic bilateral low back pain with sciatica, sciatica laterality unspecified    2. Somatic dysfunction of lumbar region    3. Sacral pain    4. Somatic dysfunction of sacral region    5. Paresthesia    6. Acquired postural kyphosis        Patient's condition:  No change     Treatment effectiveness:  Post manipulation there is better intersegmental movement and Patient claims to feel looser post manipulation      Procedures:  CMT:  09757 Chiropractic manipulative treatment 3-4 regions performed   Thoracic: Diversified, T5, T9, Prone  Lumbar: Diversified, L5, Side posture  Pelvis: Diversified, drop table  Sacrum , PSIS Right , Prone    Modalities:  PRASHANT: Keyla  points of the lumbar spine  ja points of the hips and legs  15 minutes prone    Therapeutic procedures:  Review of piriformis stretching   Perform stretching intermittently throughout the day       Response to Treatment  No Change in symptoms No increase in sx, pt stable     Prognosis: Guarded    Progress towards Goals: Patient is making progress towards the goal:   Goals:  SLEEPING: the patient specific goal is to attain his pre-injury status of 6-8  hours comfortably  SITTING: the patient specific goal is to attain pre-injury status of  2-4 hours comfortably  Laying on right side or on back     Recommendations:    Instructions:use lumbar support at all times    Follow-up:  Return to care in 1 week with ACP.

## 2018-07-28 DIAGNOSIS — K21.9 GASTROESOPHAGEAL REFLUX DISEASE WITHOUT ESOPHAGITIS: ICD-10-CM

## 2018-07-30 NOTE — TELEPHONE ENCOUNTER
"Prescription approved per [facility] Refill Protocol.  Due for physical  Vanessa DEY RN    Requested Prescriptions   Pending Prescriptions Disp Refills     omeprazole (PRILOSEC) 20 MG CR capsule [Pharmacy Med Name: OMEPRAZOLE 20MG CAPSULES] 180 capsule 0     Sig: TAKE 1 CAPSULE(20 MG) BY MOUTH TWICE DAILY    PPI Protocol Passed    7/28/2018  8:34 AM       Passed - Not on Clopidogrel (unless Pantoprazole ordered)       Passed - No diagnosis of osteoporosis on record       Passed - Recent (12 mo) or future (30 days) visit within the authorizing provider's specialty    Patient had office visit in the last 12 months or has a visit in the next 30 days with authorizing provider or within the authorizing provider's specialty.  See \"Patient Info\" tab in inbasket, or \"Choose Columns\" in Meds & Orders section of the refill encounter.           Passed - Patient is age 18 or older            "

## 2018-11-03 DIAGNOSIS — K21.9 GASTROESOPHAGEAL REFLUX DISEASE WITHOUT ESOPHAGITIS: ICD-10-CM

## 2018-11-05 NOTE — TELEPHONE ENCOUNTER
"Medication is being filled for 1 time refill only due to:  Patient needs to be seen because due for physical.   Vanessa DEY RN    Requested Prescriptions   Pending Prescriptions Disp Refills     omeprazole (PRILOSEC) 20 MG CR capsule [Pharmacy Med Name: OMEPRAZOLE 20MG CAPSULES] 180 capsule 0     Sig: TAKE ONE CAPSULE BY MOUTH TWICE DAILY    PPI Protocol Passed    11/3/2018  8:49 AM       Passed - Not on Clopidogrel (unless Pantoprazole ordered)       Passed - No diagnosis of osteoporosis on record       Passed - Recent (12 mo) or future (30 days) visit within the authorizing provider's specialty    Patient had office visit in the last 12 months or has a visit in the next 30 days with authorizing provider or within the authorizing provider's specialty.  See \"Patient Info\" tab in inbasket, or \"Choose Columns\" in Meds & Orders section of the refill encounter.             Passed - Patient is age 18 or older            "

## 2018-11-29 ENCOUNTER — OFFICE VISIT (OUTPATIENT)
Dept: FAMILY MEDICINE | Facility: CLINIC | Age: 63
End: 2018-11-29
Payer: COMMERCIAL

## 2018-11-29 VITALS
TEMPERATURE: 97.2 F | BODY MASS INDEX: 26.63 KG/M2 | HEART RATE: 71 BPM | DIASTOLIC BLOOD PRESSURE: 97 MMHG | OXYGEN SATURATION: 97 % | WEIGHT: 175.7 LBS | SYSTOLIC BLOOD PRESSURE: 165 MMHG | HEIGHT: 68 IN

## 2018-11-29 DIAGNOSIS — A60.00 HERPES SIMPLEX INFECTION OF GENITOURINARY SYSTEM: ICD-10-CM

## 2018-11-29 DIAGNOSIS — M77.12 LATERAL EPICONDYLITIS OF LEFT ELBOW: ICD-10-CM

## 2018-11-29 DIAGNOSIS — K21.9 GASTROESOPHAGEAL REFLUX DISEASE WITHOUT ESOPHAGITIS: ICD-10-CM

## 2018-11-29 DIAGNOSIS — I10 HYPERTENSION GOAL BP (BLOOD PRESSURE) < 140/90: ICD-10-CM

## 2018-11-29 DIAGNOSIS — Z00.00 ROUTINE HISTORY AND PHYSICAL EXAMINATION OF ADULT: Primary | ICD-10-CM

## 2018-11-29 DIAGNOSIS — M54.40 ACUTE BILATERAL LOW BACK PAIN WITH SCIATICA, SCIATICA LATERALITY UNSPECIFIED: ICD-10-CM

## 2018-11-29 DIAGNOSIS — Z23 NEED FOR VACCINATION: ICD-10-CM

## 2018-11-29 PROCEDURE — 80069 RENAL FUNCTION PANEL: CPT | Performed by: FAMILY MEDICINE

## 2018-11-29 PROCEDURE — 90750 HZV VACC RECOMBINANT IM: CPT | Performed by: FAMILY MEDICINE

## 2018-11-29 PROCEDURE — 90472 IMMUNIZATION ADMIN EACH ADD: CPT | Performed by: FAMILY MEDICINE

## 2018-11-29 PROCEDURE — 99396 PREV VISIT EST AGE 40-64: CPT | Mod: 25 | Performed by: FAMILY MEDICINE

## 2018-11-29 PROCEDURE — 90686 IIV4 VACC NO PRSV 0.5 ML IM: CPT | Performed by: FAMILY MEDICINE

## 2018-11-29 PROCEDURE — 36415 COLL VENOUS BLD VENIPUNCTURE: CPT | Performed by: FAMILY MEDICINE

## 2018-11-29 PROCEDURE — 99213 OFFICE O/P EST LOW 20 MIN: CPT | Mod: 25 | Performed by: FAMILY MEDICINE

## 2018-11-29 PROCEDURE — 80061 LIPID PANEL: CPT | Performed by: FAMILY MEDICINE

## 2018-11-29 PROCEDURE — 90471 IMMUNIZATION ADMIN: CPT | Performed by: FAMILY MEDICINE

## 2018-11-29 RX ORDER — HYDROCHLOROTHIAZIDE 25 MG/1
25 TABLET ORAL DAILY
Qty: 90 TABLET | Refills: 3 | Status: SHIPPED | OUTPATIENT
Start: 2018-11-29 | End: 2019-10-02

## 2018-11-29 RX ORDER — ACYCLOVIR 800 MG/1
TABLET ORAL
Qty: 30 TABLET | Refills: 1 | Status: SHIPPED | OUTPATIENT
Start: 2018-11-29 | End: 2019-10-28

## 2018-11-29 RX ORDER — CYCLOBENZAPRINE HCL 5 MG
TABLET ORAL
Qty: 45 TABLET | Refills: 1 | Status: SHIPPED | OUTPATIENT
Start: 2018-11-29 | End: 2019-10-28

## 2018-11-29 ASSESSMENT — ANXIETY QUESTIONNAIRES
2. NOT BEING ABLE TO STOP OR CONTROL WORRYING: NOT AT ALL
3. WORRYING TOO MUCH ABOUT DIFFERENT THINGS: NOT AT ALL
1. FEELING NERVOUS, ANXIOUS, OR ON EDGE: NOT AT ALL
IF YOU CHECKED OFF ANY PROBLEMS ON THIS QUESTIONNAIRE, HOW DIFFICULT HAVE THESE PROBLEMS MADE IT FOR YOU TO DO YOUR WORK, TAKE CARE OF THINGS AT HOME, OR GET ALONG WITH OTHER PEOPLE: NOT DIFFICULT AT ALL
5. BEING SO RESTLESS THAT IT IS HARD TO SIT STILL: SEVERAL DAYS
GAD7 TOTAL SCORE: 3
7. FEELING AFRAID AS IF SOMETHING AWFUL MIGHT HAPPEN: NOT AT ALL
6. BECOMING EASILY ANNOYED OR IRRITABLE: SEVERAL DAYS

## 2018-11-29 ASSESSMENT — PATIENT HEALTH QUESTIONNAIRE - PHQ9
SUM OF ALL RESPONSES TO PHQ QUESTIONS 1-9: 2
5. POOR APPETITE OR OVEREATING: SEVERAL DAYS

## 2018-11-29 NOTE — PROGRESS NOTES
SUBJECTIVE:   CC: Edgardo Dominguez is an 63 year old male who presents for preventative health visit.     Physical   Annual:     Getting at least 3 servings of Calcium per day:  NO    Bi-annual eye exam:  Yes    Dental care twice a year:  Yes    Sleep apnea or symptoms of sleep apnea:  None    Diet:  Regular (no restrictions)    Frequency of exercise:  2-3 days/week    Duration of exercise:  30-45 minutes    Taking medications regularly:  Yes    Medication side effects:  Not applicable    PHQ-2 Total Score: 0        in addition to health maintanance patient would like to discuss the following problem:    Also having some pain in his Left elbow.  Worse with activity  Work related: no painted house    Prob pert ROS:  Neuro: no significant localized weakness, numbness, tingling.    Prob pert exam .  Normal R & L upper extremity joints for ROM symmetry & tone/ no tenderness/ no effusions/ no crepitus/ or deformities.  Except:  Elbow exam - Both, full range of motion, no swelling, bursal effusion, tenderness or deformities. Except Mild tenderness Left lateral epicondyle.  Neurological exam reveals normal without focal findings. DTR's, motor strength and sensation normal.    X-ray  not indicated    Findings c/w Left lateral Epicondylitis    Patient was given instruction on use of ice, rest, exercises and NSAIDs for pain and swelling.  Call or return to clinic as needed if these symptoms worsen or fail to improve as anticipated.          Today's PHQ-2 Score:   PHQ-2 ( 1999 [employer]) 11/27/2018   Q1: Little interest or pleasure in doing things 0   Q2: Feeling down, depressed or hopeless 0   PHQ-2 Score 0   Q1: Little interest or pleasure in doing things Not at all   Q2: Feeling down, depressed or hopeless Not at all   PHQ-2 Score 0       Abuse: Current or Past(Physical, Sexual or Emotional)- No  Do you feel safe in your environment? Yes    Social History   Substance Use Topics     Smoking status: Never Smoker     Smokeless  tobacco: Never Used     Alcohol use 0.0 oz/week     0 Standard drinks or equivalent per week      Comment: 3 a week      Alcohol Use 11/27/2018   If you drink alcohol do you typically have greater than 3 drinks per day OR greater than 7 drinks per week? No   No flowsheet data found.    Last PSA: No results found for: PSA    Reviewed orders with patient. Reviewed health maintenance and updated orders accordingly - Yes  Labs reviewed in EPIC  BP Readings from Last 3 Encounters:   11/29/18 (!) 165/97   05/24/18 133/87   04/10/18 150/90    Wt Readings from Last 3 Encounters:   11/29/18 175 lb 11.2 oz (79.7 kg)   05/24/18 176 lb 11.2 oz (80.2 kg)   04/10/18 179 lb 3.2 oz (81.3 kg)                  Patient Active Problem List   Diagnosis     Esophageal reflux     Other genital herpes     Hyperlipidemia LDL goal <160     Hypertension goal BP (blood pressure) < 140/90     Advanced directives, counseling/discussion     Hallux limitus of right foot     's knee, left     Herpes simplex infection of genitourinary system     Chronic bilateral low back pain with sciatica, sciatica laterality unspecified     Somatic dysfunction of lumbar region     Sacral pain     Somatic dysfunction of sacral region     Paresthesia     Past Surgical History:   Procedure Laterality Date     ARTHRODESIS FOOT Right 3/11/2015    Procedure: ARTHRODESIS FOOT;  Surgeon: Gonzalo Watkins DPM;  Location:  [facility]     C AFF LARYNX SURG PROC UNLISTED  1963    vocal nodules removed     C APPENDECTOMY  1966     C OPEN RX ANKLE DISLOCATN+FIXATN  1978    (L) ORIF ankle     COLONOSCOPY N/A 12/18/2015    Procedure: COLONOSCOPY;  Surgeon: Ruslan Narayan MD;  Location:  [facility]     HC REMOVAL OF TONSILS,12+ Y/O  1984       Social History   Substance Use Topics     Smoking status: Never Smoker     Smokeless tobacco: Never Used     Alcohol use 0.0 oz/week     0 Standard drinks or equivalent per week      Comment: 3 a week      Family History   Problem  Relation Age of Onset     Hypertension Mother      Arthritis Mother      C.A.D. Mother      Lipids Mother      Breast Cancer Mother      mastectomy 2012     C.A.D. Father      GASTROINTESTINAL DISEASE Father      Alzheimer Disease Father      Lipids Father      Heart Disease Maternal Grandmother      Hypertension Maternal Grandmother      Cerebrovascular Disease Maternal Grandmother      Heart Disease Maternal Grandfather      Cerebrovascular Disease Maternal Grandfather      Cerebrovascular Disease Paternal Grandmother      Alzheimer Disease Paternal Grandmother      Heart Disease Paternal Grandfather      Cerebrovascular Disease Paternal Grandfather      Alcohol/Drug Sister      Depression Sister      Musculoskeletal Disorder Brother      Lipids Brother          Current Outpatient Prescriptions   Medication Sig Dispense Refill     Acetaminophen (TYLENOL PO)        acyclovir (ZOVIRAX) 800 MG tablet TAKE 1 TABLET BY MOUTH THREE TIMES DAILY AT START OF SYMPTOMS 30 tablet 1     cyclobenzaprine (FLEXERIL) 5 MG tablet Take 1-2 tabs as needed at bedtime for pain interrupting sleep 45 tablet 1     hydrochlorothiazide (HYDRODIURIL) 25 MG tablet Take 1 tablet (25 mg) by mouth daily 90 tablet 3     mometasone (ELOCON) 0.1 % cream Apply  topically daily. 45 g prn     omeprazole (PRILOSEC) 20 MG CR capsule TAKE ONE CAPSULE BY MOUTH TWICE DAILY 60 capsule 0     Pregabalin (LYRICA PO) Take 75 mg by mouth 2 times daily       meloxicam (MOBIC) 15 MG tablet Take 1 tablet (15 mg) by mouth daily 30 tablet 1     Allergies   Allergen Reactions     Hydrocodone Nausea     Oxycodone Nausea       Reviewed and updated as needed this visit by clinical staff         Reviewed and updated as needed this visit by Provider            Review of Systems  CONSTITUTIONAL: NEGATIVE for fever, chills, change in weight  INTEGUMENTARY/SKIN: NEGATIVE for worrisome rashes, moles or lesions  EYES: NEGATIVE for vision changes or irritation  ENT: NEGATIVE for  "ear, mouth and throat problems  RESP: NEGATIVE for significant cough or SOB  CV: NEGATIVE for chest pain, palpitations or peripheral edema  GI: NEGATIVE for nausea, abdominal pain, heartburn, or change in bowel habits   male: negative for dysuria, hematuria, decreased urinary stream, erectile dysfunction, urethral discharge  MUSCULOSKELETAL: NEGATIVE for significant arthralgias or myalgia  NEURO: NEGATIVE for weakness, dizziness or paresthesias  PSYCHIATRIC: NEGATIVE for changes in mood or affect    OBJECTIVE:   BP (!) 165/97  Pulse 71  Temp 97.2  F (36.2  C) (Oral)  Ht 5' 7.5\" (1.715 m)  Wt 175 lb 11.2 oz (79.7 kg)  SpO2 97%  BMI 27.11 kg/m2    Physical Exam    General Appearance: Pleasant, alert, in no acute respiratory distress.  Head Exam: Normal. Normocephalic, atraumatic. No sinus tenderness.  Eye Exam: Normal external eye, conjunctiva, lids. CARMINA.  Ear Exam: Normal auditory canals and external ears. Non-tender.  Left TM-normal. Right TM-normal.  OroPharynx Exam: Dental hygiene adequate. Normal buccal mucosa. Normal pharynx.  Neck Exam: Supple, no masses or enlarged, tender nodes.  Thyroid Exam: No nodules or enlargement or tenderness.  Chest/Respiratory Exam: Normal, comfortable, easy respirations. Chest wall normal. Lungs are clear to auscultation. No wheezing, crackles, or rhonchi.  Cardiovascular Exam: Regular rate and rhythm. No murmur, gallop, or rubs. No pedal edema.  Gastrointestinal Exam: Soft, non-tender, no masses or organomegaly.  Musculoskeletal Exam: Back is non-tender, full ROM of upper and lower extremities.  Skin: no rash, warm and dry.    Neurologic Exam: Nonfocal, no tremor. Normal gait.  Psychiatric Exam: Alert - appropriate, normal affect      ASSESSMENT/PLAN:       ICD-10-CM    1. Routine history and physical examination of adult Z00.00    2. Herpes simplex infection of genitourinary system A60.00 acyclovir (ZOVIRAX) 800 MG tablet   3. Hypertension goal BP (blood pressure) < " "140/90 I10 hydrochlorothiazide (HYDRODIURIL) 25 MG tablet     Renal panel (Alb, BUN, Ca, Cl, CO2, Creat, Gluc, Phos, K, Na)     Lipid panel reflex to direct LDL Fasting   4. Gastroesophageal reflux disease without esophagitis K21.9 omeprazole (PRILOSEC) 20 MG DR capsule     Renal panel (Alb, BUN, Ca, Cl, CO2, Creat, Gluc, Phos, K, Na)   5. Lateral epicondylitis of left elbow M77.12 cyclobenzaprine (FLEXERIL) 5 MG tablet   6. Acute bilateral low back pain with sciatica, sciatica laterality unspecified M54.40 cyclobenzaprine (FLEXERIL) 5 MG tablet   7. Need for vaccination Z23 HC FLU VAC PRESRV FREE QUAD SPLIT VIR 3+YRS IM     ZOSTER VACCINE RECOMBINANT ADJUVANTED IM NJX     HC ZOSTER VACCINE RECOMBINANT ADJUVANTED IM NJX     ADMIN 1st VACCINE     EA ADD'L VACCINE         COUNSELING:   Reviewed preventive health counseling, as reflected in patient instructions       Regular exercise       Healthy diet/nutrition    BP Readings from Last 1 Encounters:   11/29/18 (!) 165/97   Hypertensive today but patient is off of his medication for a few days  Previously it was effective    Estimated body mass index is 27.27 kg/(m^2) as calculated from the following:    Height as of 4/10/18: 5' 7.5\" (1.715 m).    Weight as of 5/24/18: 176 lb 11.2 oz (80.2 kg).           reports that he has never smoked. He has never used smokeless tobacco.      Counseling Resources:  ATP IV Guidelines  Pooled Cohorts Equation Calculator  FRAX Risk Assessment  ICSI Preventive Guidelines  Dietary Guidelines for Americans, 2010  USDA's MyPlate  ASA Prophylaxis  Lung CA Screening    Josef Ray MD  [facility]  "

## 2018-11-29 NOTE — NURSING NOTE
"Chief Complaint   Patient presents with     Physical     BP (!) 165/97  Pulse 71  Temp 97.2  F (36.2  C) (Oral)  Ht 5' 7.5\" (1.715 m)  Wt 175 lb 11.2 oz (79.7 kg)  SpO2 97%  BMI 27.11 kg/m2 Estimated body mass index is 27.11 kg/(m^2) as calculated from the following:    Height as of this encounter: 5' 7.5\" (1.715 m).    Weight as of this encounter: 175 lb 11.2 oz (79.7 kg).  Medication Reconciliation: complete      Health Maintenance that is potentially due pending provider review:  PHQ9 and GAD7    Completing forms today.    RAHEL Luu  "

## 2018-11-29 NOTE — PATIENT INSTRUCTIONS
Google:  tennis elbow eccentric exercise    Ice, advil or aleve    -----    Preventive Health Recommendations  Male Ages 50 - 64    Yearly exam:             See your health care provider every year in order to  o   Review health changes.   o   Discuss preventive care.    o   Review your medicines if your doctor has prescribed any.     Have a cholesterol test every 5 years, or more frequently if you are at risk for high cholesterol/heart disease.     Have a diabetes test (fasting glucose) every three years. If you are at risk for diabetes, you should have this test more often.     Have a colonoscopy at age 50, or have a yearly FIT test (stool test). These exams will check for colon cancer.      Talk with your health care provider about whether or not a prostate cancer screening test (PSA) is right for you.    You should be tested each year for STDs (sexually transmitted diseases), if you re at risk.     Shots: Get a flu shot each year. Get a tetanus shot every 10 years.     Nutrition:    Eat at least 5 servings of fruits and vegetables daily.     Eat whole-grain bread, whole-wheat pasta and brown rice instead of white grains and rice.     Get adequate Calcium and Vitamin D.     Lifestyle    Exercise for at least 150 minutes a week (30 minutes a day, 5 days a week). This will help you control your weight and prevent disease.     Limit alcohol to one drink per day.     No smoking.     Wear sunscreen to prevent skin cancer.     See your dentist every six months for an exam and cleaning.     See your eye doctor every 1 to 2 years.

## 2018-11-29 NOTE — MR AVS SNAPSHOT
After Visit Summary   11/29/2018    Edgardo Dominguez    MRN: [id number]           Patient Information     Date Of Birth          1955        Visit Information        Provider Department      11/29/2018 11:30 AM Josef Ray MD [facility]        Today's Diagnoses     Routine history and physical examination of adult    -  1    Herpes simplex infection of genitourinary system        Hypertension goal BP (blood pressure) < 140/90        Gastroesophageal reflux disease without esophagitis        Lateral epicondylitis of left elbow        Acute bilateral low back pain with sciatica, sciatica laterality unspecified        Need for vaccination          Care Instructions      Google:  tennis elbow eccentric exercise    Ice, advil or aleve    -----    Preventive Health Recommendations  Male Ages 50 - 64    Yearly exam:             See your health care provider every year in order to  o   Review health changes.   o   Discuss preventive care.    o   Review your medicines if your doctor has prescribed any.     Have a cholesterol test every 5 years, or more frequently if you are at risk for high cholesterol/heart disease.     Have a diabetes test (fasting glucose) every three years. If you are at risk for diabetes, you should have this test more often.     Have a colonoscopy at age 50, or have a yearly FIT test (stool test). These exams will check for colon cancer.      Talk with your health care provider about whether or not a prostate cancer screening test (PSA) is right for you.    You should be tested each year for STDs (sexually transmitted diseases), if you re at risk.     Shots: Get a flu shot each year. Get a tetanus shot every 10 years.     Nutrition:    Eat at least 5 servings of fruits and vegetables daily.     Eat whole-grain bread, whole-wheat pasta and brown rice instead of white grains and rice.     Get adequate Calcium and Vitamin D.     Lifestyle    Exercise for at  least 150 minutes a week (30 minutes a day, 5 days a week). This will help you control your weight and prevent disease.     Limit alcohol to one drink per day.     No smoking.     Wear sunscreen to prevent skin cancer.     See your dentist every six months for an exam and cleaning.     See your eye doctor every 1 to 2 years.            Follow-ups after your visit        Follow-up notes from your care team     Return in about 1 year (around 11/29/2019) for Physical Exam.      Future tests that were ordered for you today     Open Standing Orders        Priority Remaining Interval Expires Ordered    HC ZOSTER VACCINE RECOMBINANT ADJUVANTED IM NJX Routine 1/1 11/29/2019 11/29/2018            Who to contact     If you have questions or need follow up information about today's clinic visit or your schedule please contact [facility] directly at [phone].  Normal or non-critical lab and imaging results will be communicated to you by [employer], letter or phone within 4 business days after the clinic has received the results. If you do not hear from us within 7 days, please contact the clinic through [employer] or phone. If you have a critical or abnormal lab result, we will notify you by phone as soon as possible.  Submit refill requests through [employer] or call your pharmacy and they will forward the refill request to us. Please allow 3 business days for your refill to be completed.          Additional Information About Your Visit        [employer] Information     [employer] gives you secure access to your electronic health record. If you see a primary care provider, you can also send messages to your care team and make appointments. If you have questions, please call your primary care clinic.  If you do not have a primary care provider, please call [phone] and they will assist you.        Care EveryWhere ID     This is your Care EveryWhere ID. This could be used by other organizations to access your [facility]  "medical records  [id number]        Your Vitals Were     Pulse Temperature Height Pulse Oximetry BMI (Body Mass Index)       71 97.2  F (36.2  C) (Oral) 5' 7.5\" (1.715 m) 97% 27.11 kg/m2        Blood Pressure from Last 3 Encounters:   11/29/18 (!) 165/97   05/24/18 133/87   04/10/18 150/90    Weight from Last 3 Encounters:   11/29/18 175 lb 11.2 oz (79.7 kg)   05/24/18 176 lb 11.2 oz (80.2 kg)   04/10/18 179 lb 3.2 oz (81.3 kg)              We Performed the Following     HC FLU VAC PRESRV FREE QUAD SPLIT VIR 3+YRS IM     Lipid panel reflex to direct LDL Fasting     Renal panel (Alb, BUN, Ca, Cl, CO2, Creat, Gluc, Phos, K, Na)     ZOSTER VACCINE RECOMBINANT ADJUVANTED IM NJX          Today's Medication Changes          These changes are accurate as of 11/29/18 12:01 PM.  If you have any questions, ask your nurse or doctor.               These medicines have changed or have updated prescriptions.        Dose/Directions    omeprazole 20 MG DR capsule   Commonly known as:  priLOSEC   This may have changed:  See the new instructions.   Used for:  Gastroesophageal reflux disease without esophagitis   Changed by:  Josef Ray MD        Dose:  20 mg   Take 1 capsule (20 mg) by mouth 2 times daily   Quantity:  180 capsule   Refills:  3            Where to get your medicines      These medications were sent to [id number] - [place], [place] - [address] AT [facility] [address]  [address], [facility] [address]     Phone:  [phone]     acyclovir 800 MG tablet    cyclobenzaprine 5 MG tablet    hydrochlorothiazide 25 MG tablet    omeprazole 20 MG DR capsule                Primary Care Provider Office Phone # Fax #    Josef Ray [phone] [address] [facility] [address]        Equal Access to Services     TRISTAN SANTIAGO AH: Ernesto Andrade, bryon dawn, qamahsa hou la'aan ah. So [facility] " [phone].    ATENCIÓN: Si kelli sparks, tiene a raygoza disposición servicios gratuitos de asistencia lingüística. Bryant smith [phone].    We comply with applicable federal civil rights laws and [place] laws. We do not discriminate on the basis of race, color, national origin, age, disability, sex, sexual orientation, or gender identity.            Thank you!     Thank you for choosing [facility]  for your care. Our goal is always to provide you with excellent care. Hearing back from our patients is one way we can continue to improve our services. Please take a few minutes to complete the written survey that you may receive in the mail after your visit with us. Thank you!             Your Updated Medication List - Protect others around you: Learn how to safely use, store and throw away your medicines at www.disposemymeds.org.          This list is accurate as of 11/29/18 12:01 PM.  Always use your most recent med list.                   Brand Name Dispense Instructions for use Diagnosis    acyclovir 800 MG tablet    ZOVIRAX    30 tablet    TAKE 1 TABLET BY MOUTH THREE TIMES DAILY AT START OF SYMPTOMS    Herpes simplex infection of genitourinary system       cyclobenzaprine 5 MG tablet    FLEXERIL    45 tablet    Take 1-2 tabs as needed at bedtime for pain interrupting sleep    Lateral epicondylitis of left elbow, Acute bilateral low back pain with sciatica, sciatica laterality unspecified       hydrochlorothiazide 25 MG tablet    HYDRODIURIL    90 tablet    Take 1 tablet (25 mg) by mouth daily    Hypertension goal BP (blood pressure) < 140/90       LYRICA PO      Take 75 mg by mouth 2 times daily        meloxicam 15 MG tablet    MOBIC    30 tablet    Take 1 tablet (15 mg) by mouth daily    Sacral pain       mometasone 0.1 % external cream    ELOCON    45 g    Apply  topically daily.    Eczema       omeprazole 20 MG DR capsule    priLOSEC    180 capsule    Take 1 capsule (20 mg) by mouth 2 times daily     Gastroesophageal reflux disease without esophagitis       TYLENOL PO

## 2018-11-30 LAB
ALBUMIN SERPL-MCNC: 4.1 G/DL (ref 3.4–5)
ANION GAP SERPL CALCULATED.3IONS-SCNC: 8 MMOL/L (ref 3–14)
BUN SERPL-MCNC: 15 MG/DL (ref 7–30)
CALCIUM SERPL-MCNC: 9.8 MG/DL (ref 8.5–10.1)
CHLORIDE SERPL-SCNC: 104 MMOL/L (ref 94–109)
CHOLEST SERPL-MCNC: 203 MG/DL
CO2 SERPL-SCNC: 25 MMOL/L (ref 20–32)
CREAT SERPL-MCNC: 0.88 MG/DL (ref 0.66–1.25)
GFR SERPL CREATININE-BSD FRML MDRD: 87 ML/MIN/1.7M2
GLUCOSE SERPL-MCNC: 84 MG/DL (ref 70–99)
HDLC SERPL-MCNC: 42 MG/DL
LDLC SERPL CALC-MCNC: 143 MG/DL
NONHDLC SERPL-MCNC: 161 MG/DL
PHOSPHATE SERPL-MCNC: 4 MG/DL (ref 2.5–4.5)
POTASSIUM SERPL-SCNC: 4.2 MMOL/L (ref 3.4–5.3)
SODIUM SERPL-SCNC: 137 MMOL/L (ref 133–144)
TRIGL SERPL-MCNC: 91 MG/DL

## 2018-11-30 ASSESSMENT — ANXIETY QUESTIONNAIRES: GAD7 TOTAL SCORE: 3

## 2019-01-16 ENCOUNTER — MYC MEDICAL ADVICE (OUTPATIENT)
Dept: FAMILY MEDICINE | Facility: CLINIC | Age: 64
End: 2019-01-16

## 2019-02-11 ENCOUNTER — E-VISIT (OUTPATIENT)
Dept: FAMILY MEDICINE | Facility: CLINIC | Age: 64
End: 2019-02-11
Payer: COMMERCIAL

## 2019-02-11 DIAGNOSIS — I10 BENIGN ESSENTIAL HYPERTENSION: Primary | ICD-10-CM

## 2019-02-11 PROCEDURE — 99444 ZZC PHYSICIAN ONLINE EVALUATION & MANAGEMENT SERVICE: CPT | Performed by: FAMILY MEDICINE

## 2019-02-12 RX ORDER — LOSARTAN POTASSIUM 50 MG/1
50 TABLET ORAL DAILY
Qty: 30 TABLET | Refills: 3 | Status: SHIPPED | OUTPATIENT
Start: 2019-02-12 | End: 2019-06-06

## 2019-02-24 ENCOUNTER — MYC MEDICAL ADVICE (OUTPATIENT)
Dept: FAMILY MEDICINE | Facility: CLINIC | Age: 64
End: 2019-02-24

## 2019-06-07 ENCOUNTER — MYC MEDICAL ADVICE (OUTPATIENT)
Dept: FAMILY MEDICINE | Facility: CLINIC | Age: 64
End: 2019-06-07

## 2019-06-17 ENCOUNTER — MYC MEDICAL ADVICE (OUTPATIENT)
Dept: FAMILY MEDICINE | Facility: CLINIC | Age: 64
End: 2019-06-17

## 2019-06-18 ENCOUNTER — ALLIED HEALTH/NURSE VISIT (OUTPATIENT)
Dept: NURSING | Facility: CLINIC | Age: 64
End: 2019-06-18
Payer: COMMERCIAL

## 2019-06-18 VITALS — HEART RATE: 71 BPM | OXYGEN SATURATION: 98 % | DIASTOLIC BLOOD PRESSURE: 87 MMHG | SYSTOLIC BLOOD PRESSURE: 137 MMHG

## 2019-06-18 DIAGNOSIS — Z01.30 BP CHECK: Primary | ICD-10-CM

## 2019-06-18 NOTE — NURSING NOTE
"Chief Complaint   Patient presents with     Allied Health Visit     Hypertension     /87   Pulse 71   SpO2 98%  Estimated body mass index is 27.11 kg/m  as calculated from the following:    Height as of 11/29/18: 1.715 m (5' 7.5\").    Weight as of 11/29/18: 79.7 kg (175 lb 11.2 oz).  bp completed using cuff size: regular       Health Maintenance addressed:  NONE    n/a    Hiral Issa MA     "

## 2019-06-18 NOTE — PROGRESS NOTES
Edgardo Dominguez is a 63 year old patient who comes in today for a Blood Pressure check.  Initial BP:  /87   Pulse 71   SpO2 98%      71  Disposition: follow-up as previously indicated by provider

## 2019-08-02 ENCOUNTER — TRANSFERRED RECORDS (OUTPATIENT)
Dept: HEALTH INFORMATION MANAGEMENT | Facility: CLINIC | Age: 64
End: 2019-08-02

## 2019-08-12 ENCOUNTER — MYC MEDICAL ADVICE (OUTPATIENT)
Dept: FAMILY MEDICINE | Facility: CLINIC | Age: 64
End: 2019-08-12

## 2019-10-01 ENCOUNTER — HEALTH MAINTENANCE LETTER (OUTPATIENT)
Age: 64
End: 2019-10-01

## 2019-10-02 DIAGNOSIS — I10 HYPERTENSION GOAL BP (BLOOD PRESSURE) < 140/90: ICD-10-CM

## 2019-10-03 RX ORDER — HYDROCHLOROTHIAZIDE 25 MG/1
TABLET ORAL
Qty: 90 TABLET | Refills: 0 | Status: SHIPPED | OUTPATIENT
Start: 2019-10-03 | End: 2019-10-28

## 2019-10-03 NOTE — TELEPHONE ENCOUNTER
"Hydrochlorothiazide 25MG  Last Written Prescription Date:  11/29/2018  Last Fill Quantity: 90,  # refills: 3   Last office visit: 11/29/2018 with prescribing provider:  Maryam, last office visit with prescribing provider was on 02/11/2019 E-visit    Future Office Visit:      Requested Prescriptions   Pending Prescriptions Disp Refills     hydrochlorothiazide (HYDRODIURIL) 25 MG tablet [Pharmacy Med Name: HYDROCHLOROTHIAZIDE 25MG TABLETS] 90 tablet 3     Sig: TAKE 1 TABLET(25 MG) BY MOUTH DAILY       Diuretics (Including Combos) Protocol Passed - 10/2/2019 10:04 PM        Passed - Blood pressure under 140/90 in past 12 months     BP Readings from Last 3 Encounters:   06/18/19 137/87   11/29/18 (!) 165/97   05/24/18 133/87                 Passed - Recent (12 mo) or future (30 days) visit within the authorizing provider's specialty     Patient has had an office visit with the authorizing provider or a provider within the authorizing providers department within the previous 12 mos or has a future within next 30 days. See \"Patient Info\" tab in inbasket, or \"Choose Columns\" in Meds & Orders section of the refill encounter.              Passed - Medication is active on med list        Passed - Patient is age 18 or older        Passed - Normal serum creatinine on file in past 12 months     Recent Labs   Lab Test 11/29/18  1213   CR 0.88              Passed - Normal serum potassium on file in past 12 months     Recent Labs   Lab Test 11/29/18  1213   POTASSIUM 4.2                    Passed - Normal serum sodium on file in past 12 months     Recent Labs   Lab Test 11/29/18  1213                   "

## 2019-10-03 NOTE — TELEPHONE ENCOUNTER
Medication is being filled for 1 time refill only due to:  Patient needs to be seen because due for fasting physical in November.      Sent [employer] reminder

## 2019-10-28 ENCOUNTER — OFFICE VISIT (OUTPATIENT)
Dept: FAMILY MEDICINE | Facility: CLINIC | Age: 64
End: 2019-10-28
Payer: COMMERCIAL

## 2019-10-28 VITALS
SYSTOLIC BLOOD PRESSURE: 118 MMHG | BODY MASS INDEX: 26.53 KG/M2 | TEMPERATURE: 98.6 F | WEIGHT: 169 LBS | DIASTOLIC BLOOD PRESSURE: 75 MMHG | HEART RATE: 81 BPM | RESPIRATION RATE: 16 BRPM | OXYGEN SATURATION: 98 % | HEIGHT: 67 IN

## 2019-10-28 DIAGNOSIS — I10 BENIGN ESSENTIAL HYPERTENSION: ICD-10-CM

## 2019-10-28 DIAGNOSIS — E78.5 HYPERLIPIDEMIA LDL GOAL <160: ICD-10-CM

## 2019-10-28 DIAGNOSIS — M54.40 ACUTE BILATERAL LOW BACK PAIN WITH SCIATICA, SCIATICA LATERALITY UNSPECIFIED: ICD-10-CM

## 2019-10-28 DIAGNOSIS — Z23 NEED FOR VACCINATION: ICD-10-CM

## 2019-10-28 DIAGNOSIS — A60.00 HERPES SIMPLEX INFECTION OF GENITOURINARY SYSTEM: ICD-10-CM

## 2019-10-28 DIAGNOSIS — Z00.00 ROUTINE GENERAL MEDICAL EXAMINATION AT A HEALTH CARE FACILITY: Primary | ICD-10-CM

## 2019-10-28 PROCEDURE — 99213 OFFICE O/P EST LOW 20 MIN: CPT | Mod: 25 | Performed by: FAMILY MEDICINE

## 2019-10-28 PROCEDURE — 90682 RIV4 VACC RECOMBINANT DNA IM: CPT | Performed by: FAMILY MEDICINE

## 2019-10-28 PROCEDURE — 90471 IMMUNIZATION ADMIN: CPT | Performed by: FAMILY MEDICINE

## 2019-10-28 PROCEDURE — 99396 PREV VISIT EST AGE 40-64: CPT | Mod: 25 | Performed by: FAMILY MEDICINE

## 2019-10-28 RX ORDER — ATORVASTATIN CALCIUM 10 MG/1
10 TABLET, FILM COATED ORAL DAILY
Qty: 90 TABLET | Refills: 3 | Status: SHIPPED | OUTPATIENT
Start: 2019-10-28

## 2019-10-28 RX ORDER — LOSARTAN POTASSIUM 50 MG/1
100 TABLET ORAL DAILY
Qty: 180 TABLET | Refills: 3 | Status: SHIPPED | OUTPATIENT
Start: 2019-10-28 | End: 2020-04-20

## 2019-10-28 RX ORDER — ACYCLOVIR 800 MG/1
TABLET ORAL
Qty: 30 TABLET | Refills: 1 | Status: SHIPPED | OUTPATIENT
Start: 2019-10-28

## 2019-10-28 RX ORDER — CYCLOBENZAPRINE HCL 5 MG
TABLET ORAL
Qty: 45 TABLET | Refills: 1 | Status: SHIPPED | OUTPATIENT
Start: 2019-10-28 | End: 2020-02-13

## 2019-10-28 RX ORDER — HYDROCHLOROTHIAZIDE 25 MG/1
25 TABLET ORAL DAILY
Qty: 90 TABLET | Refills: 3 | Status: SHIPPED | OUTPATIENT
Start: 2019-10-28

## 2019-10-28 ASSESSMENT — MIFFLIN-ST. JEOR: SCORE: 1519.17

## 2019-10-28 NOTE — NURSING NOTE
"Chief Complaint   Patient presents with     Physical     /75   Pulse 81   Temp 98.6  F (37  C) (Oral)   Resp 16   Ht 1.708 m (5' 7.25\")   Wt 76.7 kg (169 lb)   SpO2 98%   BMI 26.27 kg/m   Estimated body mass index is 26.27 kg/m  as calculated from the following:    Height as of this encounter: 1.708 m (5' 7.25\").    Weight as of this encounter: 76.7 kg (169 lb).  bp completed using cuff size: regular       Health Maintenance addressed:  NONE    n/a    Hiral Issa CMA, MA     "

## 2019-10-28 NOTE — PROGRESS NOTES
SUBJECTIVE:   CC: Edgardo Dominguez is an 64 year old male who presents for preventative health visit.     Healthy Habits:     Getting at least 3 servings of Calcium per day:  NO    Bi-annual eye exam:  Yes    Dental care twice a year:  Yes    Sleep apnea or symptoms of sleep apnea:  None    Diet:  Regular (no restrictions)    Frequency of exercise:  4-5 days/week    Duration of exercise:  30-45 minutes    Taking medications regularly:  Yes    Medication side effects:  Not applicable    PHQ-2 Total Score: 0    Additional concerns today:  No      Patient is moving to [place] in 11/22/2019. in addition to health maintenance patient would like to discuss the following problem:    Patient has stable chronic conditions as noted in A/P including   Benign essential hypertension, Acute bilateral low back pain with sciatica, sciatica laterality unspecified, Herpes simplex infection of genitourinary system, Need for vaccination, and were also pertinent to this visit.    These diagnoses were each reviewed for stability and medications were reviewed and refilled, labs ordered and updated.    Patient also has known Hyperlipidemia LDL goal <160     The 10-year ASCVD risk score (Gilmaredi ARROYO Jr., et al., 2013) is: 13.4%    Values used to calculate the score:      Age: 64 years      Sex: Male      Is Non- : No      Diabetic: No      Tobacco smoker: No      Systolic Blood Pressure: 118 mmHg      Is BP treated: Yes      HDL Cholesterol: 42 mg/dL      Total Cholesterol: 203 mg/dL    He actually had a change of family history this year where his brother had a couple of stents placed and he is just a few years older than 10  We discussed risk factors and possibility of starting a new medication such as a statin for primary prevention  Reviewed the risks and benefits  He decided to start this new treatment  He will follow-up with us in [place]    Today's PHQ-2 Score:   PHQ-2 ( 1999 Pfizer) 10/25/2019   Q1: Little  interest or pleasure in doing things 0   Q2: Feeling down, depressed or hopeless 0   PHQ-2 Score 0   Q1: Little interest or pleasure in doing things Not at all   Q2: Feeling down, depressed or hopeless Not at all   PHQ-2 Score 0       Abuse: Current or Past(Physical, Sexual or Emotional)- No  Do you feel safe in your environment? Yes    Social History     Tobacco Use     Smoking status: Never Smoker     Smokeless tobacco: Never Used   Substance Use Topics     Alcohol use: Yes     Alcohol/week: 0.0 standard drinks     Comment: few single drinks a week     If you drink alcohol do you typically have >3 drinks per day or >7 drinks per week? No    No flowsheet data found.    Last PSA:   PSA   Date Value Ref Range Status   11/24/2006 1.11 0 - 4 ug/L Final       Reviewed orders with patient. Reviewed health maintenance and updated orders accordingly - Yes  Lab work is in process  Labs reviewed in EPIC  BP Readings from Last 3 Encounters:   10/28/19 118/75   06/18/19 137/87   11/29/18 (!) 165/97    Wt Readings from Last 3 Encounters:   10/28/19 76.7 kg (169 lb)   11/29/18 79.7 kg (175 lb 11.2 oz)   05/24/18 80.2 kg (176 lb 11.2 oz)                  Patient Active Problem List   Diagnosis     Esophageal reflux     Other genital herpes     Hyperlipidemia LDL goal <160     Hypertension goal BP (blood pressure) < 140/90     Advanced directives, counseling/discussion     Hallux limitus of right foot     's knee, left     Herpes simplex infection of genitourinary system     Chronic bilateral low back pain with sciatica, sciatica laterality unspecified     Somatic dysfunction of lumbar region     Sacral pain     Somatic dysfunction of sacral region     Paresthesia     Acute bilateral low back pain with sciatica, sciatica laterality unspecified     Past Surgical History:   Procedure Laterality Date     ARTHRODESIS FOOT Right 3/11/2015    Procedure: ARTHRODESIS FOOT;  Surgeon: Gonzalo Watkins DPM;  Location: [facility]      C AFF LARYNX SURG PROC UNLISTED  1963    vocal nodules removed     C APPENDECTOMY  1966     C OPEN RX ANKLE DISLOCATN+FIXATN  1978    (L) ORIF ankle     COLONOSCOPY N/A 12/18/2015    Procedure: COLONOSCOPY;  Surgeon: Ruslan Narayan MD;  Location: [facility]     HC REMOVAL OF TONSILS,12+ Y/O  1984       Social History     Tobacco Use     Smoking status: Never Smoker     Smokeless tobacco: Never Used   Substance Use Topics     Alcohol use: Yes     Alcohol/week: 0.0 standard drinks     Comment: few single drinks a week     Family History   Problem Relation Age of Onset     Hypertension Mother      Arthritis Mother      C.A.D. Mother      Lipids Mother      Breast Cancer Mother         mastectomy 2012     C.A.D. Father      Gastrointestinal Disease Father      Alzheimer Disease Father      Lipids Father      Heart Disease Maternal Grandmother      Hypertension Maternal Grandmother      Cerebrovascular Disease Maternal Grandmother      Heart Disease Maternal Grandfather      Cerebrovascular Disease Maternal Grandfather      Cerebrovascular Disease Paternal Grandmother      Alzheimer Disease Paternal Grandmother      Heart Disease Paternal Grandfather      Cerebrovascular Disease Paternal Grandfather      Alcohol/Drug Sister      Substance Abuse Sister      Depression Sister      Musculoskeletal Disorder Brother      Lipids Brother      Substance Abuse Nephew          Current Outpatient Medications   Medication Sig Dispense Refill     Acetaminophen (TYLENOL PO)        acyclovir (ZOVIRAX) 800 MG tablet TAKE 1 TABLET BY MOUTH THREE TIMES DAILY AT START OF SYMPTOMS 30 tablet 1     atorvastatin (LIPITOR) 10 MG tablet Take 1 tablet (10 mg) by mouth daily 90 tablet 3     cyclobenzaprine (FLEXERIL) 5 MG tablet Take 1-2 tabs as needed at bedtime for pain interrupting sleep 45 tablet 1     hydrochlorothiazide (HYDRODIURIL) 25 MG tablet Take 1 tablet (25 mg) by mouth daily 90 tablet 3     losartan (COZAAR) 50 MG tablet Take 2  "tablets (100 mg) by mouth daily 180 tablet 3     mometasone (ELOCON) 0.1 % cream Apply  topically daily. 45 g prn     omeprazole (PRILOSEC) 20 MG DR capsule Take 1 capsule (20 mg) by mouth 2 times daily 180 capsule 3     Allergies   Allergen Reactions     Hydrocodone Nausea     Oxycodone Nausea       Reviewed and updated as needed this visit by clinical staff  Tobacco  Allergies  Meds  Med Hx  Surg Hx  Fam Hx  Soc Hx        Reviewed and updated as needed this visit by Provider            Review of Systems  CONSTITUTIONAL: NEGATIVE for fever, chills, change in weight  INTEGUMENTARY/SKIN: NEGATIVE for worrisome rashes, moles or lesions  EYES: NEGATIVE for vision changes or irritation  ENT: NEGATIVE for ear, mouth and throat problems  RESP: NEGATIVE for significant cough or SOB  CV: NEGATIVE for chest pain, palpitations or peripheral edema  GI: NEGATIVE for nausea, abdominal pain, heartburn, or change in bowel habits   male: negative for dysuria, hematuria, decreased urinary stream, erectile dysfunction, urethral discharge  MUSCULOSKELETAL: NEGATIVE for significant arthralgias or myalgia  NEURO: NEGATIVE for weakness, dizziness or paresthesias  PSYCHIATRIC: NEGATIVE for changes in mood or affect    OBJECTIVE:   /75   Pulse 81   Temp 98.6  F (37  C) (Oral)   Resp 16   Ht 1.708 m (5' 7.25\")   Wt 76.7 kg (169 lb)   SpO2 98%   BMI 26.27 kg/m      Physical Exam    General Appearance: Pleasant, alert, in no acute respiratory distress.  Head Exam: Normal. Normocephalic, atraumatic. No sinus tenderness.  Eye Exam: Normal external eye, conjunctiva, lids. CARMINA.  Ear Exam: Normal auditory canals and external ears. Non-tender.  Left TM-normal. Right TM-normal.  OroPharynx Exam: Dental hygiene adequate. Normal buccal mucosa. Normal pharynx.  Neck Exam: Supple, no masses or enlarged, tender nodes.  Thyroid Exam: No nodules or enlargement or tenderness.  Chest/Respiratory Exam: Normal, comfortable, easy " "respirations. Chest wall normal. Lungs are clear to auscultation. No wheezing, crackles, or rhonchi.  Cardiovascular Exam: Regular rate and rhythm. No murmur, gallop, or rubs. No pedal edema.  Gastrointestinal Exam: Soft, non-tender, no masses or organomegaly.  Musculoskeletal Exam: Back is non-tender, full ROM of upper and lower extremities.  Skin: no rash, warm and dry.    Neurologic Exam: Nonfocal, no tremor. Normal gait.  Psychiatric Exam: Alert - appropriate, normal affect        ASSESSMENT/PLAN:       ICD-10-CM    1. Routine general medical examination at a health care facility Z00.00    2. Benign essential hypertension I10 hydrochlorothiazide (HYDRODIURIL) 25 MG tablet     losartan (COZAAR) 50 MG tablet     Basic metabolic panel   3. Acute bilateral low back pain with sciatica, sciatica laterality unspecified M54.40 cyclobenzaprine (FLEXERIL) 5 MG tablet   4. Herpes simplex infection of genitourinary system A60.00 acyclovir (ZOVIRAX) 800 MG tablet     Basic metabolic panel   5. Need for vaccination Z23 C RIV4 (FLUBLOK) VACCINE RECOMBINANT DNA PRSRV ANTIBIO FREE, IM [12958]   6. Hyperlipidemia LDL goal <160 E78.5 atorvastatin (LIPITOR) 10 MG tablet       COUNSELING:   Reviewed preventive health counseling, as reflected in patient instructions       Regular exercise       Healthy diet/nutrition    Estimated body mass index is 26.27 kg/m  as calculated from the following:    Height as of this encounter: 1.708 m (5' 7.25\").    Weight as of this encounter: 76.7 kg (169 lb).          reports that he has never smoked. He has never used smokeless tobacco.      Counseling Resources:  ATP IV Guidelines  Pooled Cohorts Equation Calculator  FRAX Risk Assessment  ICSI Preventive Guidelines  Dietary Guidelines for Americans, 2010  USDA's MyPlate  ASA Prophylaxis  Lung CA Screening    Josef Cholo Ray MD  [facility]  "

## 2020-01-27 ENCOUNTER — MYC REFILL (OUTPATIENT)
Dept: FAMILY MEDICINE | Facility: CLINIC | Age: 65
End: 2020-01-27

## 2020-01-27 DIAGNOSIS — K21.9 GASTROESOPHAGEAL REFLUX DISEASE WITHOUT ESOPHAGITIS: ICD-10-CM

## 2020-01-28 NOTE — TELEPHONE ENCOUNTER
"Prescription approved per [facility] [place].  MyChart sent to pt  Vanessa DEY RN    Last Written Prescription Date:  11/29/2018  Last Fill Quantity: 180,  # refills: 3   Last office visit: 10/28/2019 with prescribing provider:     Future Office Visit:    Requested Prescriptions   Pending Prescriptions Disp Refills     omeprazole (PRILOSEC) 20 MG DR capsule 180 capsule 3     Sig: Take 1 capsule (20 mg) by mouth 2 times daily       PPI Protocol Passed - 1/27/2020 11:29 AM        Passed - Not on Clopidogrel (unless Pantoprazole ordered)        Passed - No diagnosis of osteoporosis on record        Passed - Recent (12 mo) or future (30 days) visit within the authorizing provider's specialty     Patient has had an office visit with the authorizing provider or a provider within the authorizing providers department within the previous 12 mos or has a future within next 30 days. See \"Patient Info\" tab in inbasket, or \"Choose Columns\" in Meds & Orders section of the refill encounter.              Passed - Medication is active on med list        Passed - Patient is age 18 or older        "

## 2020-02-12 DIAGNOSIS — M54.40 ACUTE BILATERAL LOW BACK PAIN WITH SCIATICA, SCIATICA LATERALITY UNSPECIFIED: ICD-10-CM

## 2020-02-12 NOTE — TELEPHONE ENCOUNTER
Routing refill request to provider for review/approval because:  Drug not on the [facility] refill protocol   Vanessa DEY RN    Last Written Prescription Date:  10/28/2019  Last Fill Quantity: 45,  # refills: 1   Last office visit: 10/28/2019 with prescribing provider:     Future Office Visit:    Requested Prescriptions   Pending Prescriptions Disp Refills     cyclobenzaprine (FLEXERIL) 5 MG tablet [Pharmacy Med Name: CYCLOBENZAPRINE 5 MG TABLET] 45 tablet 1     Sig: take 1-2 tablets by mouth if needed at bedtime for pain THAT INTERUPTS sleep       There is no refill protocol information for this order

## 2020-02-13 RX ORDER — CYCLOBENZAPRINE HCL 5 MG
TABLET ORAL
Qty: 45 TABLET | Refills: 1 | Status: SHIPPED | OUTPATIENT
Start: 2020-02-13 | End: 2020-03-13

## 2020-03-12 DIAGNOSIS — M54.40 ACUTE BILATERAL LOW BACK PAIN WITH SCIATICA, SCIATICA LATERALITY UNSPECIFIED: ICD-10-CM

## 2020-03-13 RX ORDER — CYCLOBENZAPRINE HCL 5 MG
TABLET ORAL
Qty: 45 TABLET | Refills: 1 | Status: SHIPPED | OUTPATIENT
Start: 2020-03-13

## 2020-03-13 NOTE — TELEPHONE ENCOUNTER
Routing refill request to provider for review/approval because:  Drug not on the [facility] refill protocol   Vanessa DEY RN    Last Written Prescription Date:  2/13/2020  Last Fill Quantity: 45,  # refills: 1   Last office visit: 10/28/2019 with prescribing provider:     Future Office Visit:     Requested Prescriptions   Pending Prescriptions Disp Refills     cyclobenzaprine (FLEXERIL) 5 MG tablet [Pharmacy Med Name: CYCLOBENZAPRINE 5 MG TABLET] 45 tablet 1     Sig: take 1-2 tablets by mouth if needed at bedtime for pain THAT INTERUPTS SLEEP       There is no refill protocol information for this order

## 2020-04-18 DIAGNOSIS — I10 BENIGN ESSENTIAL HYPERTENSION: ICD-10-CM

## 2020-04-20 RX ORDER — LOSARTAN POTASSIUM 50 MG/1
TABLET ORAL
Qty: 180 TABLET | Refills: 1 | Status: SHIPPED | OUTPATIENT
Start: 2020-04-20

## 2021-01-15 ENCOUNTER — HEALTH MAINTENANCE LETTER (OUTPATIENT)
Age: 66
End: 2021-01-15

## 2021-09-04 ENCOUNTER — HEALTH MAINTENANCE LETTER (OUTPATIENT)
Age: 66
End: 2021-09-04

## 2022-02-19 ENCOUNTER — HEALTH MAINTENANCE LETTER (OUTPATIENT)
Age: 67
End: 2022-02-19

## 2022-10-16 ENCOUNTER — HEALTH MAINTENANCE LETTER (OUTPATIENT)
Age: 67
End: 2022-10-16

## 2023-04-01 ENCOUNTER — HEALTH MAINTENANCE LETTER (OUTPATIENT)
Age: 68
End: 2023-04-01